# Patient Record
Sex: FEMALE | Race: WHITE | NOT HISPANIC OR LATINO | Employment: FULL TIME | URBAN - METROPOLITAN AREA
[De-identification: names, ages, dates, MRNs, and addresses within clinical notes are randomized per-mention and may not be internally consistent; named-entity substitution may affect disease eponyms.]

---

## 2017-01-03 ENCOUNTER — HOSPITAL ENCOUNTER (OUTPATIENT)
Dept: CARDIOLOGY | Facility: HOSPITAL | Age: 52
Discharge: HOME | End: 2017-01-03
Attending: INTERNAL MEDICINE | Admitting: INTERNAL MEDICINE

## 2017-01-10 ENCOUNTER — HOSPITAL ENCOUNTER (OUTPATIENT)
Dept: PREOP | Facility: HOSPITAL | Age: 52
Setting detail: HOSPITAL OUTPATIENT SURGERY
Discharge: HOME | End: 2017-01-10
Attending: SURGERY | Admitting: SURGERY

## 2017-01-10 LAB
BACTERIA UREASE ISLT QL: NORMAL
GLUCOSE BLD-MCNC: 209 MG/DL (ref 70–105)
Lab: NORMAL
MICRO REPORT STATUS: NORMAL
SPECIMEN SOURCE: NORMAL

## 2017-03-07 ENCOUNTER — HOSPITAL ENCOUNTER (OUTPATIENT)
Dept: LAB | Facility: HOSPITAL | Age: 52
Discharge: HOME | End: 2017-03-07
Attending: SURGERY | Admitting: SURGERY

## 2017-03-20 ENCOUNTER — HOSPITAL ENCOUNTER (OUTPATIENT)
Dept: OTHER | Facility: HOSPITAL | Age: 52
Discharge: HOME | End: 2017-03-20
Attending: SURGERY | Admitting: SURGERY

## 2017-03-20 LAB
ALBUMIN SERPL-MCNC: 4.2 G/DL (ref 3.5–4.8)
ALBUMIN/GLOB SERPL: 1.1 {RATIO} (ref 1–1.7)
ALP SERPL-CCNC: 58 IU/L (ref 32–91)
ALT SERPL-CCNC: 27 IU/L (ref 14–54)
ANION GAP SERPL CALC-SCNC: 15.7 MMOL/L (ref 10–20)
AST SERPL-CCNC: 28 IU/L (ref 15–41)
BASOPHILS # BLD AUTO: 0.1 10*3/UL (ref 0–0.2)
BASOPHILS NFR BLD AUTO: 1 % (ref 0–2)
BILIRUB SERPL-MCNC: 0.8 MG/DL (ref 0.3–1.2)
BUN SERPL-MCNC: 14 MG/DL (ref 8–20)
BUN/CREAT SERPL: 12.7 (ref 5.4–26.2)
CALCIUM SERPL-MCNC: 9.8 MG/DL (ref 8.9–10.3)
CHLORIDE SERPL-SCNC: 103 MMOL/L (ref 101–111)
CONV CO2: 26 MMOL/L (ref 22–32)
CONV TOTAL PROTEIN: 8.1 G/DL (ref 6.1–7.9)
CREAT UR-MCNC: 1.1 MG/DL (ref 0.4–1)
DIFFERENTIAL METHOD BLD: (no result)
EOSINOPHIL # BLD AUTO: 0.2 10*3/UL (ref 0–0.3)
EOSINOPHIL # BLD AUTO: 3 % (ref 0–3)
ERYTHROCYTE [DISTWIDTH] IN BLOOD BY AUTOMATED COUNT: 14.5 % (ref 11.5–14.5)
GLOBULIN UR ELPH-MCNC: 3.9 G/DL (ref 2.5–3.8)
GLUCOSE SERPL-MCNC: 122 MG/DL (ref 65–99)
HCT VFR BLD AUTO: 42.8 % (ref 35–49)
HGB BLD-MCNC: 14.4 G/DL (ref 12–15)
IRON SERPL-MCNC: 52 UG/DL (ref 28–170)
LYMPHOCYTES # BLD AUTO: 2 10*3/UL (ref 0.8–4.8)
LYMPHOCYTES NFR BLD AUTO: 28 % (ref 18–42)
MAGNESIUM SERPL-MCNC: 2.2 MG/DL (ref 1.8–2.5)
MCH RBC QN AUTO: 27.9 PG (ref 26–32)
MCHC RBC AUTO-ENTMCNC: 33.5 G/DL (ref 32–36)
MCV RBC AUTO: 83.2 FL (ref 80–94)
MONOCYTES # BLD AUTO: 0.4 10*3/UL (ref 0.1–1.3)
MONOCYTES NFR BLD AUTO: 5 % (ref 2–11)
NEUTROPHILS # BLD AUTO: 4.6 10*3/UL (ref 2.3–8.6)
NEUTROPHILS NFR BLD AUTO: 63 % (ref 50–75)
NRBC BLD AUTO-RTO: 0 /100{WBCS}
NRBC/RBC NFR BLD MANUAL: 0 10*3/UL
PLATELET # BLD AUTO: 289 10*3/UL (ref 150–450)
PMV BLD AUTO: 8.4 FL (ref 7.4–10.4)
POTASSIUM SERPL-SCNC: 4.7 MMOL/L (ref 3.6–5.1)
PREALB SERPL-MCNC: NORMAL MG/DL (ref 16–38)
RBC # BLD AUTO: 5.14 10*6/UL (ref 4–5.4)
SODIUM SERPL-SCNC: 140 MMOL/L (ref 136–144)
WBC # BLD AUTO: 7.3 10*3/UL (ref 4.5–11.5)

## 2017-05-15 ENCOUNTER — HOSPITAL ENCOUNTER (OUTPATIENT)
Dept: OTHER | Facility: HOSPITAL | Age: 52
Discharge: HOME | End: 2017-05-15
Attending: PHYSICIAN ASSISTANT | Admitting: PHYSICIAN ASSISTANT

## 2017-05-15 LAB
ALBUMIN SERPL-MCNC: 4.3 G/DL (ref 3.5–4.8)
ALBUMIN/GLOB SERPL: 1.5 {RATIO} (ref 1–1.7)
ALP SERPL-CCNC: 63 IU/L (ref 32–91)
ALT SERPL-CCNC: 18 IU/L (ref 14–54)
ANION GAP SERPL CALC-SCNC: 16.1 MMOL/L (ref 10–20)
AST SERPL-CCNC: 21 IU/L (ref 15–41)
BASOPHILS # BLD AUTO: 0.1 10*3/UL (ref 0–0.2)
BASOPHILS NFR BLD AUTO: 1 % (ref 0–2)
BILIRUB SERPL-MCNC: 0.9 MG/DL (ref 0.3–1.2)
BUN SERPL-MCNC: 15 MG/DL (ref 8–20)
BUN/CREAT SERPL: 15 (ref 5.4–26.2)
CALCIUM SERPL-MCNC: 9.5 MG/DL (ref 8.9–10.3)
CHLORIDE SERPL-SCNC: 103 MMOL/L (ref 101–111)
CONV CO2: 25 MMOL/L (ref 22–32)
CONV TOTAL PROTEIN: 7.1 G/DL (ref 6.1–7.9)
CREAT UR-MCNC: 1 MG/DL (ref 0.4–1)
DIFFERENTIAL METHOD BLD: (no result)
EOSINOPHIL # BLD AUTO: 0.2 10*3/UL (ref 0–0.3)
EOSINOPHIL # BLD AUTO: 2 % (ref 0–3)
ERYTHROCYTE [DISTWIDTH] IN BLOOD BY AUTOMATED COUNT: 14.7 % (ref 11.5–14.5)
GLOBULIN UR ELPH-MCNC: 2.8 G/DL (ref 2.5–3.8)
GLUCOSE SERPL-MCNC: 118 MG/DL (ref 65–99)
HCT VFR BLD AUTO: 43.2 % (ref 35–49)
HGB BLD-MCNC: 14.4 G/DL (ref 12–15)
LYMPHOCYTES # BLD AUTO: 2.4 10*3/UL (ref 0.8–4.8)
LYMPHOCYTES NFR BLD AUTO: 30 % (ref 18–42)
MCH RBC QN AUTO: 28.1 PG (ref 26–32)
MCHC RBC AUTO-ENTMCNC: 33.3 G/DL (ref 32–36)
MCV RBC AUTO: 84.2 FL (ref 80–94)
MONOCYTES # BLD AUTO: 0.4 10*3/UL (ref 0.1–1.3)
MONOCYTES NFR BLD AUTO: 5 % (ref 2–11)
NEUTROPHILS # BLD AUTO: 4.9 10*3/UL (ref 2.3–8.6)
NEUTROPHILS NFR BLD AUTO: 62 % (ref 50–75)
NRBC BLD AUTO-RTO: 0 /100{WBCS}
NRBC/RBC NFR BLD MANUAL: 0 10*3/UL
PLATELET # BLD AUTO: 249 10*3/UL (ref 150–450)
PMV BLD AUTO: 8.1 FL (ref 7.4–10.4)
POTASSIUM SERPL-SCNC: 4.1 MMOL/L (ref 3.6–5.1)
RBC # BLD AUTO: 5.13 10*6/UL (ref 4–5.4)
SODIUM SERPL-SCNC: 140 MMOL/L (ref 136–144)
WBC # BLD AUTO: 7.9 10*3/UL (ref 4.5–11.5)

## 2017-06-14 ENCOUNTER — HOSPITAL ENCOUNTER (OUTPATIENT)
Dept: GENERAL RADIOLOGY | Facility: HOSPITAL | Age: 52
Discharge: HOME | End: 2017-06-14
Attending: INTERNAL MEDICINE | Admitting: INTERNAL MEDICINE

## 2017-08-14 ENCOUNTER — HOSPITAL ENCOUNTER (OUTPATIENT)
Dept: OTHER | Facility: HOSPITAL | Age: 52
Discharge: HOME | End: 2017-08-14
Attending: SURGERY | Admitting: SURGERY

## 2017-08-14 LAB
ALBUMIN SERPL-MCNC: 4.1 G/DL (ref 3.5–4.8)
ALBUMIN/GLOB SERPL: 1.2 {RATIO} (ref 1–1.7)
ALP SERPL-CCNC: 60 IU/L (ref 32–91)
ALT SERPL-CCNC: 13 IU/L (ref 14–54)
ANION GAP SERPL CALC-SCNC: 13.2 MMOL/L (ref 10–20)
AST SERPL-CCNC: 19 IU/L (ref 15–41)
BASOPHILS # BLD AUTO: 0.1 10*3/UL (ref 0–0.2)
BASOPHILS NFR BLD AUTO: 1 % (ref 0–2)
BILIRUB SERPL-MCNC: 1 MG/DL (ref 0.3–1.2)
BUN SERPL-MCNC: 17 MG/DL (ref 8–20)
BUN/CREAT SERPL: 17 (ref 5.4–26.2)
CALCIUM SERPL-MCNC: 9.7 MG/DL (ref 8.9–10.3)
CHLORIDE SERPL-SCNC: 103 MMOL/L (ref 101–111)
CONV CO2: 27 MMOL/L (ref 22–32)
CONV TOTAL PROTEIN: 7.6 G/DL (ref 6.1–7.9)
CREAT UR-MCNC: 1 MG/DL (ref 0.4–1)
DIFFERENTIAL METHOD BLD: (no result)
EOSINOPHIL # BLD AUTO: 0.1 10*3/UL (ref 0–0.3)
EOSINOPHIL # BLD AUTO: 1 % (ref 0–3)
ERYTHROCYTE [DISTWIDTH] IN BLOOD BY AUTOMATED COUNT: 15.1 % (ref 11.5–14.5)
FERRITIN SERPL-MCNC: 36 NG/ML (ref 11–307)
FOLATE SERPL-MCNC: >24.8 NG/ML (ref 5.9–24.8)
GLOBULIN UR ELPH-MCNC: 3.5 G/DL (ref 2.5–3.8)
GLUCOSE SERPL-MCNC: 107 MG/DL (ref 65–99)
HCT VFR BLD AUTO: 40.8 % (ref 35–49)
HGB BLD-MCNC: 13.7 G/DL (ref 12–15)
LYMPHOCYTES # BLD AUTO: 2.2 10*3/UL (ref 0.8–4.8)
LYMPHOCYTES NFR BLD AUTO: 25 % (ref 18–42)
MCH RBC QN AUTO: 28.7 PG (ref 26–32)
MCHC RBC AUTO-ENTMCNC: 33.6 G/DL (ref 32–36)
MCV RBC AUTO: 85.3 FL (ref 80–94)
MONOCYTES # BLD AUTO: 0.5 10*3/UL (ref 0.1–1.3)
MONOCYTES NFR BLD AUTO: 5 % (ref 2–11)
NEUTROPHILS # BLD AUTO: 5.9 10*3/UL (ref 2.3–8.6)
NEUTROPHILS NFR BLD AUTO: 68 % (ref 50–75)
NRBC BLD AUTO-RTO: 0 /100{WBCS}
NRBC/RBC NFR BLD MANUAL: 0 10*3/UL
PLATELET # BLD AUTO: 251 10*3/UL (ref 150–450)
PMV BLD AUTO: 7.5 FL (ref 7.4–10.4)
POTASSIUM SERPL-SCNC: 4.2 MMOL/L (ref 3.6–5.1)
RBC # BLD AUTO: 4.78 10*6/UL (ref 4–5.4)
SODIUM SERPL-SCNC: 139 MMOL/L (ref 136–144)
WBC # BLD AUTO: 8.9 10*3/UL (ref 4.5–11.5)

## 2018-02-02 ENCOUNTER — HOSPITAL ENCOUNTER (OUTPATIENT)
Dept: MAMMOGRAPHY | Facility: HOSPITAL | Age: 53
Discharge: HOME | End: 2018-02-02
Attending: NURSE PRACTITIONER | Admitting: NURSE PRACTITIONER

## 2018-03-01 ENCOUNTER — HOSPITAL ENCOUNTER (OUTPATIENT)
Dept: MAMMOGRAPHY | Facility: HOSPITAL | Age: 53
Discharge: HOME | End: 2018-03-01
Attending: NURSE PRACTITIONER | Admitting: NURSE PRACTITIONER

## 2018-07-30 ENCOUNTER — HOSPITAL ENCOUNTER (OUTPATIENT)
Dept: OTHER | Facility: HOSPITAL | Age: 53
Discharge: HOME | End: 2018-07-30
Attending: SURGERY | Admitting: SURGERY

## 2018-07-30 LAB
ALBUMIN SERPL-MCNC: 4.2 G/DL (ref 3.5–4.8)
ALBUMIN/GLOB SERPL: 1.2 {RATIO} (ref 1–1.7)
ALP SERPL-CCNC: 88 IU/L (ref 32–91)
ALT SERPL-CCNC: 16 IU/L (ref 14–54)
ANION GAP SERPL CALC-SCNC: 12.1 MMOL/L (ref 10–20)
AST SERPL-CCNC: 23 IU/L (ref 15–41)
BASOPHILS # BLD AUTO: 0.1 10*3/UL (ref 0–0.2)
BASOPHILS NFR BLD AUTO: 1 % (ref 0–2)
BILIRUB SERPL-MCNC: 0.7 MG/DL (ref 0.3–1.2)
BUN SERPL-MCNC: 19 MG/DL (ref 8–20)
BUN/CREAT SERPL: 19 (ref 5.4–26.2)
CALCIUM SERPL-MCNC: 9.9 MG/DL (ref 8.9–10.3)
CHLORIDE SERPL-SCNC: 101 MMOL/L (ref 101–111)
CONV CO2: 27 MMOL/L (ref 22–32)
CONV TOTAL PROTEIN: 7.7 G/DL (ref 6.1–7.9)
CREAT UR-MCNC: 1 MG/DL (ref 0.4–1)
DIFFERENTIAL METHOD BLD: (no result)
EOSINOPHIL # BLD AUTO: 0.2 10*3/UL (ref 0–0.3)
EOSINOPHIL # BLD AUTO: 2 % (ref 0–3)
ERYTHROCYTE [DISTWIDTH] IN BLOOD BY AUTOMATED COUNT: 13.7 % (ref 11.5–14.5)
GLOBULIN UR ELPH-MCNC: 3.5 G/DL (ref 2.5–3.8)
GLUCOSE SERPL-MCNC: 168 MG/DL (ref 65–99)
HCT VFR BLD AUTO: 43.1 % (ref 35–49)
HGB BLD-MCNC: 14.6 G/DL (ref 12–15)
IRON SERPL-MCNC: 64 UG/DL (ref 28–170)
LYMPHOCYTES # BLD AUTO: 2.6 10*3/UL (ref 0.8–4.8)
LYMPHOCYTES NFR BLD AUTO: 31 % (ref 18–42)
MAGNESIUM SERPL-MCNC: 2 MG/DL (ref 1.8–2.5)
MCH RBC QN AUTO: 29 PG (ref 26–32)
MCHC RBC AUTO-ENTMCNC: 33.8 G/DL (ref 32–36)
MCV RBC AUTO: 85.9 FL (ref 80–94)
MONOCYTES # BLD AUTO: 0.5 10*3/UL (ref 0.1–1.3)
MONOCYTES NFR BLD AUTO: 6 % (ref 2–11)
NEUTROPHILS # BLD AUTO: 5 10*3/UL (ref 2.3–8.6)
NEUTROPHILS NFR BLD AUTO: 60 % (ref 50–75)
NRBC BLD AUTO-RTO: 0 /100{WBCS}
NRBC/RBC NFR BLD MANUAL: 0 10*3/UL
PHOSPHATE SERPL-MCNC: 3.5 MG/DL (ref 2.4–4.7)
PLATELET # BLD AUTO: 279 10*3/UL (ref 150–450)
PMV BLD AUTO: 7.5 FL (ref 7.4–10.4)
POTASSIUM SERPL-SCNC: 4.1 MMOL/L (ref 3.6–5.1)
PREALB SERPL-MCNC: NORMAL MG/DL (ref 16–38)
RBC # BLD AUTO: 5.02 10*6/UL (ref 4–5.4)
SODIUM SERPL-SCNC: 136 MMOL/L (ref 136–144)
WBC # BLD AUTO: 8.4 10*3/UL (ref 4.5–11.5)

## 2019-05-20 ENCOUNTER — HOSPITAL ENCOUNTER (OUTPATIENT)
Dept: MAMMOGRAPHY | Facility: HOSPITAL | Age: 54
Discharge: HOME | End: 2019-05-20
Attending: NURSE PRACTITIONER | Admitting: NURSE PRACTITIONER

## 2019-06-11 ENCOUNTER — CONVERSION ENCOUNTER (OUTPATIENT)
Dept: OTHER | Facility: HOSPITAL | Age: 54
End: 2019-06-11

## 2019-06-12 VITALS
HEART RATE: 67 BPM | HEIGHT: 65 IN | SYSTOLIC BLOOD PRESSURE: 121 MMHG | BODY MASS INDEX: 49.75 KG/M2 | DIASTOLIC BLOOD PRESSURE: 86 MMHG

## 2019-10-14 ENCOUNTER — OFFICE VISIT (OUTPATIENT)
Dept: ORTHOPEDIC SURGERY | Facility: CLINIC | Age: 54
End: 2019-10-14

## 2019-10-14 VITALS
BODY MASS INDEX: 50.02 KG/M2 | DIASTOLIC BLOOD PRESSURE: 87 MMHG | HEIGHT: 64 IN | SYSTOLIC BLOOD PRESSURE: 139 MMHG | WEIGHT: 293 LBS | HEART RATE: 62 BPM

## 2019-10-14 DIAGNOSIS — M25.561 ACUTE PAIN OF BOTH KNEES: Primary | ICD-10-CM

## 2019-10-14 DIAGNOSIS — M17.0 BILATERAL PRIMARY OSTEOARTHRITIS OF KNEE: ICD-10-CM

## 2019-10-14 DIAGNOSIS — M25.562 ACUTE PAIN OF BOTH KNEES: Primary | ICD-10-CM

## 2019-10-14 PROBLEM — E78.5 HYPERLIPIDEMIA: Status: ACTIVE | Noted: 2019-10-14

## 2019-10-14 PROBLEM — Z00.8 OTHER SPECIFIED GENERAL MEDICAL EXAMINATIONS: Status: ACTIVE | Noted: 2017-07-03

## 2019-10-14 PROBLEM — IMO0002 DDD (DEGENERATIVE DISC DISEASE): Status: ACTIVE | Noted: 2019-10-14

## 2019-10-14 PROBLEM — N39.0 URINARY TRACT INFECTION: Status: ACTIVE | Noted: 2017-12-19

## 2019-10-14 PROBLEM — F41.9 ANXIETY: Status: ACTIVE | Noted: 2019-10-14

## 2019-10-14 PROBLEM — M17.9 OSTEOARTHRITIS OF KNEE: Status: ACTIVE | Noted: 2017-06-13

## 2019-10-14 PROBLEM — Z98.84 STATUS POST BARIATRIC SURGERY: Status: ACTIVE | Noted: 2017-03-08

## 2019-10-14 PROCEDURE — 99203 OFFICE O/P NEW LOW 30 MIN: CPT | Performed by: FAMILY MEDICINE

## 2019-10-14 RX ORDER — CHLORAL HYDRATE 500 MG
CAPSULE ORAL
COMMUNITY

## 2019-10-14 RX ORDER — NAPROXEN SODIUM 220 MG
220 TABLET ORAL 2 TIMES DAILY PRN
COMMUNITY
End: 2019-10-16

## 2019-10-14 RX ORDER — SENNOSIDES 8.6 MG
650 CAPSULE ORAL
COMMUNITY

## 2019-10-14 NOTE — PROGRESS NOTES
Primary Care Sports Medicine Office Visit Note     Patient ID: Marco Heath is a 54 y.o. female.    Chief Complaint:  Chief Complaint   Patient presents with   • Left Knee - Initial Evaluation   • Right Knee - Initial Evaluation     HPI:    Ms. Marco Heath is a 54 y.o. female who presents to the clinic today for bilateral knee pain.  She is morbidly obese, and has had a considerable amount of knee pain for the past 5 to 10 years, worsening.  She states she has not had any trauma, injury, or fall.  Insidious onset knee pain.  Worse with activity, better with rest.  Worse with stairs or incline activity.    Past Medical History:   Diagnosis Date   • Anxiety 10/14/2019   • Arthritis 2015   • Body mass index 60.0-69.9, adult (CMS/[facility]) 12/6/2016   • Cancer (CMS/[facility]) Uterine 2010   • CTS (carpal tunnel syndrome) 1992   • Depression 11/28/2016   • Diabetes mellitus (CMS/[facility]) 2012   • Essential hypertension 12/17/2013    Overview:  2015 IMO UPDATE   • Hyperlipidemia 10/14/2019   • Hypertension 2010   • Knee pain, right 11/5/2018   • Osteoarthritis Since 2015   • Osteoarthritis of knee 6/13/2017   • Type 2 diabetes mellitus without complications (CMS/[facility]) 11/28/2016       Past Surgical History:   Procedure Laterality Date   • CARPAL TUNNEL RELEASE  1992       Family History   Problem Relation Age of Onset   • Osteoporosis Mother    • Osteoporosis Maternal Grandmother      Social History     Occupational History   • Not on file   Tobacco Use   • Smoking status: Never Smoker   Substance and Sexual Activity   • Alcohol use: No   • Drug use: No   • Sexual activity: Not Currently     Partners: Male      Review of Systems   Constitutional: Negative for activity change and fever.   Respiratory: Negative for cough and shortness of breath.    Cardiovascular: Negative for chest pain.   Gastrointestinal: Negative for constipation, diarrhea, nausea and vomiting.   Musculoskeletal: Positive for arthralgias.   Skin: Negative for color  "change and rash.   Neurological: Negative for weakness.   Hematological: Does not bruise/bleed easily.       Objective:    /87 (BP Location: Left arm, Patient Position: Sitting, Cuff Size: Adult)   Pulse 62   Ht 162.6 cm (64\")   Wt (!) 147 kg (323 lb 12.8 oz)   BMI 55.58 kg/m²     Physical Examination:  Physical Exam   Constitutional: She appears well-developed and well-nourished. No distress.   HENT:   Head: Normocephalic and atraumatic.   Eyes: Conjunctivae are normal.   Cardiovascular: Intact distal pulses.   Pulmonary/Chest: Effort normal. No respiratory distress.   Musculoskeletal:        Right knee: She exhibits no effusion.        Left knee: She exhibits no effusion.   Neurological: She is alert.   Skin: Skin is warm. Capillary refill takes less than 2 seconds. She is not diaphoretic.   Nursing note and vitals reviewed.    Left Ankle Exam     Range of Motion   The patient has normal left ankle ROM.       Right Knee Exam     Muscle Strength   The patient has normal right knee strength.    Tenderness   The patient is experiencing tenderness in the lateral joint line, medial joint line and patella.    Range of Motion   Right knee extension: mildly decreased end ROM.   Right knee flexion: mildly decreased end ROM.     Tests   Rosalino:  Medial - negative Lateral - negative  Varus: negative Valgus: negative  Right knee patellar apprehension test: +patellar grind, +farzana il.    Other   Erythema: absent  Sensation: normal  Pulse: present (distal to the knee, DP and PT palpable)  Swelling: none  Effusion: no effusion present      Left Knee Exam     Muscle Strength   The patient has normal left knee strength.    Tenderness   The patient is experiencing tenderness in the lateral joint line, medial joint line and patella.    Range of Motion   Left knee extension: mildly decreased end ROM.   Left knee flexion: mildly decreased end ROM.     Tests   Rosalino:  Medial - negative Lateral - negative  Varus: " "negative Valgus: negative  Left knee patellar apprehension test: +patellar grind testing, +farzana li testing.    Other   Erythema: absent  Sensation: normal  Pulse: present (distal to the knee, DP and PT palpable)  Swelling: none  Effusion: no effusion present          Imaging and other tests:  Three-view XR of bilateral knees today yields obvious and gross joint hypertrophy with osteophytic change tricompartmentally.  There is also some sclerosis and joint space narrowing.  Worse in the medial compartment.    Assessment and Plan:    1. Acute pain of both knees  - XR Knee 3 View Bilateral    2. Bilateral primary osteoarthritis of knee    Status discussed conservative measures for osteoarthritis of bilateral knees.  Though she is relatively severe she has not attempted any treatment modalities for this.  I recommended she continue to attempt to lose weight as this would help significantly.  I would like to see her exercising 20 minutes a day 3 days a week on fluid motion knee exercises, i.e. biking, swimming, elliptical machine.  Also like for her to attempt glucosamine/chondroitin supplementation.  She was given a prescription for meloxicam today as well for anti-inflammatory benefit.  I would like to see her back in 2 to 3 months to evaluate pain at that time.  Otherwise, could consider intra-articular injection and/or hyaluronic acid at that time if needed.      Armond BENJAMIN \"Chance\" Miguel DURANT DO, CAQSM  10/16/19  12:12 PM    Disclaimer: Please note that areas of this note were completed with computer voice recognition software.  Quite often unanticipated grammatical, syntax, homophones, and other interpretive errors are inadvertently transcribed by the computer software. Please excuse any errors that have escaped final proofreading.  "

## 2019-10-16 RX ORDER — MELOXICAM 15 MG/1
15 TABLET ORAL DAILY PRN
Qty: 30 TABLET | Refills: 4 | Status: SHIPPED | OUTPATIENT
Start: 2019-10-16 | End: 2020-01-27

## 2019-10-21 ENCOUNTER — OFFICE VISIT (OUTPATIENT)
Dept: ORTHOPEDIC SURGERY | Facility: CLINIC | Age: 54
End: 2019-10-21

## 2019-10-21 VITALS
HEART RATE: 58 BPM | SYSTOLIC BLOOD PRESSURE: 144 MMHG | BODY MASS INDEX: 50.02 KG/M2 | WEIGHT: 293 LBS | DIASTOLIC BLOOD PRESSURE: 87 MMHG | HEIGHT: 64 IN

## 2019-10-21 DIAGNOSIS — M17.0 BILATERAL PRIMARY OSTEOARTHRITIS OF KNEE: Primary | ICD-10-CM

## 2019-10-21 PROCEDURE — 20611 DRAIN/INJ JOINT/BURSA W/US: CPT | Performed by: FAMILY MEDICINE

## 2019-10-21 NOTE — PROGRESS NOTES
"Primary Care Sports Medicine Office Visit Note     Patient ID: Marco Heath is a 54 y.o. female.    Chief Complaint:  Chief Complaint   Patient presents with   • Right Knee - Follow-up, Pain   • Left Knee - Follow-up, Pain     HPI:    Ms. Marco Heath is a 54 y.o. female who presents to the clinic today for hyaluronic acid injection.  Please see note dated 10/14/2019 for complete history.  She was going to attempt conservative measures, but changed her mind and return here today for hyaluronic acid.  No new trauma, injury, or fall.  Insidious onset knee pain continues, deep and achy, as it was previous to last visit.  No new complaint.  Here for injection only today.    Past Medical History:   Diagnosis Date   • Anxiety 10/14/2019   • Arthritis 2015   • Body mass index 60.0-69.9, adult (CMS/[facility]) 12/6/2016   • Cancer (CMS/[facility]) Uterine 2010   • CTS (carpal tunnel syndrome) 1992   • Depression 11/28/2016   • Diabetes mellitus (CMS/[facility]) 2012   • Essential hypertension 12/17/2013    Overview:  2015 IMO UPDATE   • Hyperlipidemia 10/14/2019   • Hypertension 2010   • Knee pain, right 11/5/2018   • Osteoarthritis Since 2015   • Osteoarthritis of knee 6/13/2017   • Type 2 diabetes mellitus without complications (CMS/[facility]) 11/28/2016       Past Surgical History:   Procedure Laterality Date   • CARPAL TUNNEL RELEASE  1992       Family History   Problem Relation Age of Onset   • Osteoporosis Mother    • Osteoporosis Maternal Grandmother      Social History     Occupational History   • Not on file   Tobacco Use   • Smoking status: Never Smoker   Substance and Sexual Activity   • Alcohol use: No   • Drug use: No   • Sexual activity: Not Currently     Partners: Male      Review of Systems   Constitutional: Negative for activity change, fatigue and fever.   Musculoskeletal: Positive for arthralgias.   Skin: Negative for color change and rash.     Objective:    /87   Pulse 58   Ht 162.6 cm (64\")   Wt (!) 147 kg (323 lb)   " BMI 55.44 kg/m²     Physical Examination:  Physical Exam   Constitutional: She appears well-developed and well-nourished. No distress.   obese   HENT:   Head: Normocephalic and atraumatic.   Eyes: Conjunctivae are normal.   Cardiovascular: Intact distal pulses.   Pulmonary/Chest: Effort normal. No respiratory distress.   Musculoskeletal:        Right knee: She exhibits no effusion.        Left knee: She exhibits no effusion.   Neurological: She is alert.   Skin: Skin is warm. Capillary refill takes less than 2 seconds. She is not diaphoretic.   Nursing note and vitals reviewed.    Left Ankle Exam     Range of Motion   The patient has normal left ankle ROM.       Right Knee Exam     Muscle Strength   The patient has normal right knee strength.    Tenderness   The patient is experiencing tenderness in the lateral joint line, medial joint line and patella.    Range of Motion   Right knee flexion: Significantly decreased end ROM due to body habitus.     Tests   Rosalino:  Medial - negative Lateral - negative  Varus: negative Valgus: negative  Right knee patellar apprehension test: +patellar grind, +farzana li.    Other   Erythema: absent  Sensation: normal  Pulse: present (distal to the knee, DP and PT palpable)  Swelling: none  Effusion: no effusion present    Comments:  Positive patellar grind testing, positive Khang Don.      Left Knee Exam     Muscle Strength   The patient has normal left knee strength.    Tenderness   The patient is experiencing tenderness in the lateral joint line, medial joint line and patella.    Range of Motion   Left knee flexion: Significantly decreased end ROM due to body habitus.     Tests   Rosalino:  Medial - negative Lateral - negative  Varus: negative Valgus: negative  Left knee patellar apprehension test: +patellar grind testing, +farzana li testing.    Other   Erythema: absent  Sensation: normal  Pulse: present (distal to the knee, DP and PT palpable)  Swelling:  "none  Effusion: no effusion present    Comments:  Positive patellar grind testing, positive Khang Don.          Imaging and other tests:  No new imaging today    Assessment and Plan:    1. Bilateral primary osteoarthritis of knee    After discussing risk and benefits, the patient elects to proceed with hyaluronic acid injection today.  I recommended she ice the knee after injection 2-3 times daily.  She can continue anti-inflammatory medications as necessary.  Otherwise decrease activity for the next 3 to 5 days, then okay to return to normal daily activity.  Injection well today without complication under ultrasound guidance (due to body habitus).  RTC in 3 months.      Armond BENJAMIN \"Chance\" Miguel DURANT DO, CAQSM  10/21/19  5:02 PM    Disclaimer: Please note that areas of this note were completed with computer voice recognition software.  Quite often unanticipated grammatical, syntax, homophones, and other interpretive errors are inadvertently transcribed by the computer software. Please excuse any errors that have escaped final proofreading.  "

## 2019-10-21 NOTE — PROGRESS NOTES
Procedure   Large Joint Arthrocentesis  Date/Time: 10/21/2019 5:03 PM  Consent given by: patient  Site marked: site marked  Timeout: Immediately prior to procedure a time out was called to verify the correct patient, procedure, equipment, support staff and site/side marked as required   Supporting Documentation  Indications: pain   Procedure Details  Location: knee - Knee joint: Bilateral.  Preparation: Patient was prepped and draped in the usual sterile fashion  Needle size: 22 G  Approach: anteromedial  Medications administered: 88 mg Hyaluronan 88 MG/4ML  Patient tolerance: patient tolerated the procedure well with no immediate complications (Blood loss negligable, pt admits to immediate decrease in pain and improved ROM with gentle ambulation post injection.)      Due to body habitus, the above procedure was performed under the guidance of ultrasound.  This was necessary with the cost of this medication to ensure intra-articular placement as anatomic guidance is very difficult in this patient due to the size and anatomy of bilateral knees.  Please see saved images in the ultrasound machine.

## 2019-10-22 ENCOUNTER — TELEPHONE (OUTPATIENT)
Dept: ORTHOPEDIC SURGERY | Facility: CLINIC | Age: 54
End: 2019-10-22

## 2019-10-22 NOTE — TELEPHONE ENCOUNTER
A manual benefit verification request has been submitted and is currently in progress. We will notify you when your response is ready for review - usually within one to two business days.<

## 2020-01-27 ENCOUNTER — OFFICE VISIT (OUTPATIENT)
Dept: ORTHOPEDIC SURGERY | Facility: CLINIC | Age: 55
End: 2020-01-27

## 2020-01-27 VITALS
HEIGHT: 64 IN | SYSTOLIC BLOOD PRESSURE: 162 MMHG | DIASTOLIC BLOOD PRESSURE: 91 MMHG | WEIGHT: 293 LBS | BODY MASS INDEX: 50.02 KG/M2 | HEART RATE: 67 BPM

## 2020-01-27 DIAGNOSIS — M17.0 BILATERAL PRIMARY OSTEOARTHRITIS OF KNEE: ICD-10-CM

## 2020-01-27 PROCEDURE — 99213 OFFICE O/P EST LOW 20 MIN: CPT | Performed by: FAMILY MEDICINE

## 2020-02-03 PROCEDURE — 20610 DRAIN/INJ JOINT/BURSA W/O US: CPT | Performed by: FAMILY MEDICINE

## 2020-02-03 RX ORDER — MELOXICAM 15 MG/1
15 TABLET ORAL DAILY PRN
Qty: 30 TABLET | Refills: 4 | Status: SHIPPED | OUTPATIENT
Start: 2020-02-03 | End: 2020-11-02 | Stop reason: SDUPTHER

## 2020-02-03 RX ORDER — TRIAMCINOLONE ACETONIDE 40 MG/ML
80 INJECTION, SUSPENSION INTRA-ARTICULAR; INTRAMUSCULAR
Status: COMPLETED | OUTPATIENT
Start: 2020-02-03 | End: 2020-02-03

## 2020-02-03 RX ADMIN — TRIAMCINOLONE ACETONIDE 80 MG: 40 INJECTION, SUSPENSION INTRA-ARTICULAR; INTRAMUSCULAR at 12:43

## 2020-02-03 NOTE — PROGRESS NOTES
Procedure   Large Joint Arthrocentesis  Date/Time: 2/3/2020 12:43 PM  Consent given by: patient  Site marked: site marked  Timeout: Immediately prior to procedure a time out was called to verify the correct patient, procedure, equipment, support staff and site/side marked as required   Supporting Documentation  Indications: pain   Procedure Details  Location: knee - Knee joint: bilateral knees.  Preparation: Patient was prepped and draped in the usual sterile fashion  Needle size: 25 G  Approach: anteromedial  Medications administered: 80 mg triamcinolone acetonide 40 MG/ML (2cc of 1% lidocaine without epinepherine, and 2cc of 40mg Kenalog)  Patient tolerance: patient tolerated the procedure well with no immediate complications (Blood loss negligable, pt admits to immediate decrease in pain and improved ROM with gentle ambulation post injection.)

## 2020-04-27 ENCOUNTER — OFFICE VISIT (OUTPATIENT)
Dept: ORTHOPEDIC SURGERY | Facility: CLINIC | Age: 55
End: 2020-04-27

## 2020-04-27 VITALS
WEIGHT: 293 LBS | SYSTOLIC BLOOD PRESSURE: 153 MMHG | BODY MASS INDEX: 50.02 KG/M2 | DIASTOLIC BLOOD PRESSURE: 96 MMHG | HEART RATE: 76 BPM | HEIGHT: 64 IN

## 2020-04-27 DIAGNOSIS — M17.0 PRIMARY OSTEOARTHRITIS OF BOTH KNEES: Primary | ICD-10-CM

## 2020-04-27 DIAGNOSIS — M62.830 LUMBAR PARASPINAL MUSCLE SPASM: ICD-10-CM

## 2020-04-27 PROCEDURE — 99213 OFFICE O/P EST LOW 20 MIN: CPT | Performed by: FAMILY MEDICINE

## 2020-04-27 PROCEDURE — 20610 DRAIN/INJ JOINT/BURSA W/O US: CPT | Performed by: FAMILY MEDICINE

## 2020-04-27 RX ORDER — TRIAMCINOLONE ACETONIDE 40 MG/ML
80 INJECTION, SUSPENSION INTRA-ARTICULAR; INTRAMUSCULAR
Status: COMPLETED | OUTPATIENT
Start: 2020-04-27 | End: 2020-04-27

## 2020-04-27 RX ORDER — VITAMIN B COMPLEX
CAPSULE ORAL DAILY
COMMUNITY
End: 2020-05-18

## 2020-04-27 RX ADMIN — TRIAMCINOLONE ACETONIDE 80 MG: 40 INJECTION, SUSPENSION INTRA-ARTICULAR; INTRAMUSCULAR at 16:59

## 2020-04-27 NOTE — PROGRESS NOTES
Primary Care Sports Medicine Office Visit Note     Patient ID: Marco Heath is a 55 y.o. female.    Chief Complaint:  Chief Complaint   Patient presents with   • Right Knee - Follow-up, Pain   • Left Knee - Follow-up, Pain     HPI:    Ms. Marco Heath is a 55 y.o. female who returns to the clinic today for known osteoarthritis of bilateral knees.  Right knee is doing well today, unfortunately left knee pain has returned.  She requests repeat corticosteroid injection.  She has had pain relief of bilateral knees for greater than 2-1/2 weeks, and now at 3 months has not yet had return of pain to her right knee.  She has had a good result with corticosteroid.  The pain that has returned to her left knee is similar in type and location to previous known osteoarthritic pain.  Deep and achy.  No new falls or injuries.    Lastly, she also states she has considerable amount of pain in the left side of her low back.  She has known degenerative disc disease, but denies any lightening, tingling, numbness, or other neurologic symptom.  Tightness, spasm, and pain in the musculature on the left side of her low back only.    Past Medical History:   Diagnosis Date   • Anxiety 10/14/2019   • Arthritis 2015   • Body mass index 60.0-69.9, adult (CMS/[facility]) 12/6/2016   • Cancer (CMS/[facility]) Uterine 2010   • CTS (carpal tunnel syndrome) 1992   • Depression 11/28/2016   • Diabetes mellitus (CMS/[facility]) 2012   • Essential hypertension 12/17/2013    Overview:  2015 IMO UPDATE   • Hyperlipidemia 10/14/2019   • Hypertension 2010   • Knee pain, right 11/5/2018   • Osteoarthritis Since 2015   • Osteoarthritis of knee 6/13/2017   • Type 2 diabetes mellitus without complications (CMS/[facility]) 11/28/2016       Past Surgical History:   Procedure Laterality Date   • CARPAL TUNNEL RELEASE  1992       Family History   Problem Relation Age of Onset   • Osteoporosis Mother    • Osteoporosis Maternal Grandmother      Social History     Occupational History   • Not  "on file   Tobacco Use   • Smoking status: Never Smoker   Substance and Sexual Activity   • Alcohol use: No   • Drug use: No   • Sexual activity: Not Currently     Partners: Male      Review of Systems   Constitutional: Negative for activity change and fever.   Musculoskeletal: Positive for arthralgias and back pain.   Skin: Negative for color change and rash.   Neurological: Negative for weakness.       Objective:    /96 (BP Location: Left arm, Patient Position: Sitting, Cuff Size: Large Adult)   Pulse 76   Ht 162.6 cm (64\")   Wt (!) 149 kg (328 lb)   BMI 56.30 kg/m²     Physical Examination:  Physical Exam   Constitutional: She appears well-developed and well-nourished. No distress.   Obese   HENT:   Head: Normocephalic and atraumatic.   Eyes: Conjunctivae are normal.   Cardiovascular: Intact distal pulses.   Pulmonary/Chest: Effort normal. No respiratory distress.   Musculoskeletal:        Left knee: She exhibits no effusion.   Neurological: She is alert.   Skin: Skin is warm. Capillary refill takes less than 2 seconds. She is not diaphoretic.   Nursing note and vitals reviewed.    Left Ankle Exam     Range of Motion   The patient has normal left ankle ROM.       Left Knee Exam     Muscle Strength   The patient has normal left knee strength.    Tenderness   The patient is experiencing tenderness in the lateral joint line, medial joint line and patella.    Range of Motion   Left knee extension: mildly decreased end ROM.   Left knee flexion: mildly decreased end ROM.     Tests   Rosalino:  Medial - negative Lateral - negative  Varus: negative Valgus: negative  Left knee patellar apprehension test: +patellar grind testing, +farzana li testing.    Other   Erythema: absent  Sensation: normal  Pulse: present (distal to the knee, DP and PT palpable)  Swelling: none  Effusion: no effusion present    Comments:  Exam difficult due to large body habitus      Back Exam     Comments:  Mild decreased range of " "motion to left sided rotation, and left sidebending.  Straight leg raise negative.  There is tenderness palpation of the paraspinal lumbar musculature, but no tenderness to the bony midline.  Strength of left lower extremity is 5/5 to lower large muscle groups, and sensation is intact to the entirety of the left lower extremity.          Imaging and other tests:  No new imaging today.    Assessment and Plan:    1. Primary osteoarthritis of both knees    2.  Paraspinal lumbar muscle spasm    After discussion of risks and benefits, the patient elected to proceed with corticosteroid injection to the left knee.  The patient tolerated this procedure well without any complaints or problems.  I recommended continuation of conservative management as previous, RTC in 3-6 months or sooner if symptoms recur.    Otherwise, we discussed over-the-counter anti-inflammatories and Flexeril for lumbar paraspinal muscle spasm.  I would like for her to take 1 Flexeril nightly only so that she is not drowsy during the day for the next 7 days.  Call in 1 week if no improvement.    Armond BENJAMIN \"Chance\" Miguel DURANT DO, CAQSM  04/27/20  16:58    Disclaimer: Please note that areas of this note were completed with computer voice recognition software.  Quite often unanticipated grammatical, syntax, homophones, and other interpretive errors are inadvertently transcribed by the computer software. Please excuse any errors that have escaped final proofreading.  "

## 2020-04-27 NOTE — PROGRESS NOTES
Procedure   Large Joint Arthrocentesis: L knee  Date/Time: 4/27/2020 4:59 PM  Consent given by: patient  Timeout: Immediately prior to procedure a time out was called to verify the correct patient, procedure, equipment, support staff and site/side marked as required   Supporting Documentation  Indications: pain   Procedure Details  Location: knee - L knee  Preparation: Patient was prepped and draped in the usual sterile fashion  Needle size: 25 G  Approach: anteromedial  Medications administered: 80 mg triamcinolone acetonide 40 MG/ML (2cc of 1% lidocaine without epinepherine, and 2cc of 40mg Kenalog)  Patient tolerance: patient tolerated the procedure well with no immediate complications (Blood loss negligable, pt admits to immediate decrease in pain and improved ROM with gentle ambulation post injection.)

## 2020-04-28 DIAGNOSIS — M62.830 LUMBAR PARASPINAL MUSCLE SPASM: Primary | ICD-10-CM

## 2020-04-28 RX ORDER — CYCLOBENZAPRINE HCL 5 MG
5 TABLET ORAL
Qty: 7 TABLET | Refills: 0 | Status: SHIPPED | OUTPATIENT
Start: 2020-04-28 | End: 2020-05-18

## 2020-05-18 ENCOUNTER — OFFICE VISIT (OUTPATIENT)
Dept: ORTHOPEDIC SURGERY | Facility: CLINIC | Age: 55
End: 2020-05-18

## 2020-05-18 VITALS
BODY MASS INDEX: 50.02 KG/M2 | HEART RATE: 71 BPM | WEIGHT: 293 LBS | TEMPERATURE: 97.3 F | HEIGHT: 64 IN | DIASTOLIC BLOOD PRESSURE: 86 MMHG | SYSTOLIC BLOOD PRESSURE: 138 MMHG

## 2020-05-18 DIAGNOSIS — M62.830 LUMBAR PARASPINAL MUSCLE SPASM: Primary | ICD-10-CM

## 2020-05-18 PROCEDURE — 99214 OFFICE O/P EST MOD 30 MIN: CPT | Performed by: FAMILY MEDICINE

## 2020-05-18 RX ORDER — CYCLOBENZAPRINE HCL 5 MG
5 TABLET ORAL 3 TIMES DAILY PRN
Qty: 30 TABLET | Refills: 0 | Status: SHIPPED | OUTPATIENT
Start: 2020-05-18 | End: 2020-07-28

## 2020-05-18 NOTE — PROGRESS NOTES
Primary Care Sports Medicine Office Visit Note     Patient ID: Marco Heath is a 55 y.o. female.    Chief Complaint:  Chief Complaint   Patient presents with   • Middle Back - Pain, Consult     HPI:    Ms. Marco Heath is a 55 y.o. female who presents to the clinic today for back evaluation s/p fall. Pt states that she had a fall on 5/8/2020 (10 days ago) and was seen in the [facility] in [place]. She states at that time she was told that she did not have any obvious fractures. She had XR lumbar spine and was told it was essentially normal/nonacute (I do not have this study today). Today she states that she continues to have pain mostly left-sided low back.  She did fall to her right, tightly anton her left lumbar spine.  Since that time she complains of worsening of previous condition, tight spasm and pain of left-sided lumbar musculature, but she denies any worsening/change/new numbness, tingling, weakness, or other radicular symptoms.  Low back muscular pain only.    Past Medical History:   Diagnosis Date   • Anxiety 10/14/2019   • Arthritis 2015   • Body mass index 60.0-69.9, adult (CMS/[facility]) 12/6/2016   • Cancer (CMS/[facility]) Uterine 2010   • CTS (carpal tunnel syndrome) 1992   • Depression 11/28/2016   • Diabetes mellitus (CMS/[facility]) 2012   • Essential hypertension 12/17/2013    Overview:  2015 IMO UPDATE   • Hyperlipidemia 10/14/2019   • Hypertension 2010   • Knee pain, right 11/5/2018   • Osteoarthritis Since 2015   • Osteoarthritis of knee 6/13/2017   • Type 2 diabetes mellitus without complications (CMS/[facility]) 11/28/2016       Past Surgical History:   Procedure Laterality Date   • CARPAL TUNNEL RELEASE  1992       Family History   Problem Relation Age of Onset   • Osteoporosis Mother    • Osteoporosis Maternal Grandmother      Social History     Occupational History   • Not on file   Tobacco Use   • Smoking status: Never Smoker   • Smokeless tobacco: Never Used   Substance and Sexual Activity   •  "Alcohol use: No   • Drug use: No   • Sexual activity: Not Currently     Partners: Male      Review of Systems   Constitutional: Negative for activity change and fever.   Musculoskeletal: Positive for arthralgias, back pain and myalgias.   Skin: Negative for color change and rash.   Neurological: Negative for weakness.       Objective:    /86   Pulse 71   Temp 97.3 °F (36.3 °C) (Oral)   Ht 162.6 cm (64\")   Wt (!) 150 kg (330 lb)   BMI 56.64 kg/m²     Physical Examination:  Physical Exam   Constitutional: She appears well-developed and well-nourished. No distress.   overweight   HENT:   Head: Normocephalic and atraumatic.   Eyes: Conjunctivae are normal.   Cardiovascular: Intact distal pulses.   Pulmonary/Chest: Effort normal. No respiratory distress.   Neurological: She is alert.   Skin: Skin is warm. Capillary refill takes less than 2 seconds. She is not diaphoretic.   Nursing note and vitals reviewed.    Back Exam     Tenderness   The patient is experiencing tenderness in the lumbar (Moderate tenderness to palpation and spasm easily palpable on the left-sided lumbar paraspinal musculature.).    Range of Motion   Extension: normal   Flexion: normal   Lateral bend right: abnormal   Lateral bend left: normal     Muscle Strength   Right Quadriceps:  5/5   Left Quadriceps:  5/5   Right Hamstrings:  5/5   Left Hamstrings:  5/5     Tests   Straight leg raise left: negative    Reflexes   Patellar: normal    Other   Sensation: normal  Gait: antalgic   Erythema: no back redness          Imaging and other tests:  No new imaging today.     Assessment and Plan:    1. Lumbar paraspinal muscle spasm  - cyclobenzaprine (FLEXERIL) 5 MG tablet; Take 1 tablet by mouth 3 (Three) Times a Day As Needed for Muscle Spasms.  Dispense: 30 tablet; Refill: 0    I discussed with the patient that with known diabetes mellitus and already having used previous steroid, I would like to stay away from this for preservation of " "normoglycemia.  Otherwise, we will dispense stronger muscle relaxer in the form of Flexeril, okay to take once nightly.  May advance to 3 times daily if needed to break spasm cycle.  RTC in 2-4 weeks if no improvement.    Armond BENJAMIN \"Chance\" Miguel DURANT DO, CAQSM  05/20/20  09:25    Disclaimer: Please note that areas of this note were completed with computer voice recognition software.  Quite often unanticipated grammatical, syntax, homophones, and other interpretive errors are inadvertently transcribed by the computer software. Please excuse any errors that have escaped final proofreading.  "

## 2020-07-02 ENCOUNTER — TRANSCRIBE ORDERS (OUTPATIENT)
Dept: ADMINISTRATIVE | Facility: HOSPITAL | Age: 55
End: 2020-07-02

## 2020-07-02 DIAGNOSIS — E11.9 DIABETES TYPE 2, NO OCULAR INVOLVEMENT (HCC): Primary | ICD-10-CM

## 2020-07-17 ENCOUNTER — HOSPITAL ENCOUNTER (OUTPATIENT)
Dept: CARDIOLOGY | Facility: HOSPITAL | Age: 55
Discharge: HOME | End: 2020-07-17
Admitting: OPHTHALMOLOGY

## 2020-07-17 ENCOUNTER — HOSPITAL ENCOUNTER (OUTPATIENT)
Dept: CARDIOLOGY | Facility: HOSPITAL | Age: 55
Discharge: HOME | End: 2020-07-17

## 2020-07-17 VITALS
DIASTOLIC BLOOD PRESSURE: 105 MMHG | BODY MASS INDEX: 50.02 KG/M2 | SYSTOLIC BLOOD PRESSURE: 190 MMHG | HEIGHT: 64 IN | HEART RATE: 72 BPM | WEIGHT: 293 LBS

## 2020-07-17 DIAGNOSIS — E11.9 DIABETES TYPE 2, NO OCULAR INVOLVEMENT (HCC): ICD-10-CM

## 2020-07-17 PROCEDURE — 93880 EXTRACRANIAL BILAT STUDY: CPT

## 2020-07-17 PROCEDURE — 93306 TTE W/DOPPLER COMPLETE: CPT

## 2020-07-21 LAB
ASCENDING AORTA: 3.8 CM
BH CV ECHO MEAS - ACS: 2.5 CM
BH CV ECHO MEAS - AO MAX PG (FULL): 1.7 MMHG
BH CV ECHO MEAS - AO MAX PG: 9.9 MMHG
BH CV ECHO MEAS - AO MEAN PG (FULL): 0.68 MMHG
BH CV ECHO MEAS - AO MEAN PG: 4.9 MMHG
BH CV ECHO MEAS - AO ROOT AREA (BSA CORRECTED): 1.4
BH CV ECHO MEAS - AO ROOT AREA: 8.4 CM^2
BH CV ECHO MEAS - AO ROOT DIAM: 3.3 CM
BH CV ECHO MEAS - AO V2 MAX: 157.6 CM/SEC
BH CV ECHO MEAS - AO V2 MEAN: 103.9 CM/SEC
BH CV ECHO MEAS - AO V2 VTI: 33.8 CM
BH CV ECHO MEAS - ASC AORTA: 3.8 CM
BH CV ECHO MEAS - AVA(I,A): 2.9 CM^2
BH CV ECHO MEAS - AVA(I,D): 2.9 CM^2
BH CV ECHO MEAS - AVA(V,A): 2.8 CM^2
BH CV ECHO MEAS - AVA(V,D): 2.8 CM^2
BH CV ECHO MEAS - BSA(HAYCOCK): 2.7 M^2
BH CV ECHO MEAS - BSA: 2.4 M^2
BH CV ECHO MEAS - BZI_BMI: 56.6 KILOGRAMS/M^2
BH CV ECHO MEAS - BZI_METRIC_HEIGHT: 162.6 CM
BH CV ECHO MEAS - BZI_METRIC_WEIGHT: 149.7 KG
BH CV ECHO MEAS - EDV(CUBED): 141.3 ML
BH CV ECHO MEAS - EDV(MOD-SP2): 94.9 ML
BH CV ECHO MEAS - EDV(MOD-SP4): 118.7 ML
BH CV ECHO MEAS - EDV(TEICH): 130 ML
BH CV ECHO MEAS - EF(CUBED): 71.8 %
BH CV ECHO MEAS - EF(MOD-BP): 56 %
BH CV ECHO MEAS - EF(MOD-SP2): 57 %
BH CV ECHO MEAS - EF(MOD-SP4): 57.3 %
BH CV ECHO MEAS - EF(TEICH): 63.1 %
BH CV ECHO MEAS - ESV(CUBED): 39.9 ML
BH CV ECHO MEAS - ESV(MOD-SP2): 40.8 ML
BH CV ECHO MEAS - ESV(MOD-SP4): 50.7 ML
BH CV ECHO MEAS - ESV(TEICH): 48 ML
BH CV ECHO MEAS - FS: 34.4 %
BH CV ECHO MEAS - IVS/LVPW: 1.3
BH CV ECHO MEAS - IVSD: 1.3 CM
BH CV ECHO MEAS - LA DIMENSION(2D): 4 CM
BH CV ECHO MEAS - LA DIMENSION: 4 CM
BH CV ECHO MEAS - LA/AO: 1.2
BH CV ECHO MEAS - LAT PEAK E' VEL: 7 CM/SEC
BH CV ECHO MEAS - LV DIASTOLIC VOL/BSA (35-75): 49.1 ML/M^2
BH CV ECHO MEAS - LV IVRT: 0.08 SEC
BH CV ECHO MEAS - LV MASS(C)D: 225.9 GRAMS
BH CV ECHO MEAS - LV MASS(C)DI: 93.3 GRAMS/M^2
BH CV ECHO MEAS - LV MAX PG: 8.3 MMHG
BH CV ECHO MEAS - LV MEAN PG: 4.3 MMHG
BH CV ECHO MEAS - LV SYSTOLIC VOL/BSA (12-30): 21 ML/M^2
BH CV ECHO MEAS - LV V1 MAX: 143.7 CM/SEC
BH CV ECHO MEAS - LV V1 MEAN: 96.9 CM/SEC
BH CV ECHO MEAS - LV V1 VTI: 32 CM
BH CV ECHO MEAS - LVIDD: 5.2 CM
BH CV ECHO MEAS - LVIDS: 3.4 CM
BH CV ECHO MEAS - LVOT AREA: 3 CM^2
BH CV ECHO MEAS - LVOT DIAM: 2 CM
BH CV ECHO MEAS - LVPWD: 0.97 CM
BH CV ECHO MEAS - MED PEAK E' VEL: 8 CM/SEC
BH CV ECHO MEAS - MV A MAX VEL: 107.6 CM/SEC
BH CV ECHO MEAS - MV DEC SLOPE: 363.7 CM/SEC^2
BH CV ECHO MEAS - MV DEC TIME: 0.26 SEC
BH CV ECHO MEAS - MV E MAX VEL: 95.8 CM/SEC
BH CV ECHO MEAS - MV E/A: 0.89
BH CV ECHO MEAS - MV MAX PG: 4.8 MMHG
BH CV ECHO MEAS - MV MEAN PG: 1.6 MMHG
BH CV ECHO MEAS - MV P1/2T: 51 MSEC
BH CV ECHO MEAS - MV V2 MAX: 109.5 CM/SEC
BH CV ECHO MEAS - MV V2 MEAN: 56.1 CM/SEC
BH CV ECHO MEAS - MV V2 VTI: 33.4 CM
BH CV ECHO MEAS - MVA(P1/2T): 4.3 CM2
BH CV ECHO MEAS - MVA(VTI): 2.9 CM^2
BH CV ECHO MEAS - PA ACC SLOPE: 417 CM/SEC2
BH CV ECHO MEAS - PA ACC TIME: 0.12 SEC
BH CV ECHO MEAS - PA MAX PG (FULL): 1.3 MMHG
BH CV ECHO MEAS - PA MAX PG: 2.6 MMHG
BH CV ECHO MEAS - PA MEAN PG (FULL): 0.8 MMHG
BH CV ECHO MEAS - PA MEAN PG: 1.5 MMHG
BH CV ECHO MEAS - PA PR(ACCEL): 24.6 MMHG
BH CV ECHO MEAS - PA V2 MAX: 81.4 CM/SEC
BH CV ECHO MEAS - PA V2 MEAN: 59.1 CM/SEC
BH CV ECHO MEAS - PA V2 VTI: 20.4 CM
BH CV ECHO MEAS - PULM A REVS DUR: 0.12 SEC
BH CV ECHO MEAS - PULM A REVS VEL: 33.1 CM/SEC
BH CV ECHO MEAS - PULM DIAS VEL: 48.9 CM/SEC
BH CV ECHO MEAS - PULM S/D: 1.5
BH CV ECHO MEAS - PULM SYS VEL: 71.9 CM/SEC
BH CV ECHO MEAS - RAP SYSTOLE: 8 MMHG
BH CV ECHO MEAS - RV MAX PG: 1.3 MMHG
BH CV ECHO MEAS - RV MEAN PG: 0.73 MMHG
BH CV ECHO MEAS - RV V1 MAX: 57 CM/SEC
BH CV ECHO MEAS - RV V1 MEAN: 40.6 CM/SEC
BH CV ECHO MEAS - RV V1 VTI: 12.8 CM
BH CV ECHO MEAS - RVSP: 29 MMHG
BH CV ECHO MEAS - SI(AO): 117.2 ML/M^2
BH CV ECHO MEAS - SI(CUBED): 41.9 ML/M^2
BH CV ECHO MEAS - SI(LVOT): 40.2 ML/M^2
BH CV ECHO MEAS - SI(MOD-SP2): 22.4 ML/M^2
BH CV ECHO MEAS - SI(MOD-SP4): 28.1 ML/M^2
BH CV ECHO MEAS - SI(TEICH): 33.9 ML/M^2
BH CV ECHO MEAS - SV(AO): 283.6 ML
BH CV ECHO MEAS - SV(CUBED): 101.4 ML
BH CV ECHO MEAS - SV(LVOT): 97.3 ML
BH CV ECHO MEAS - SV(MOD-SP2): 54.1 ML
BH CV ECHO MEAS - SV(MOD-SP4): 68 ML
BH CV ECHO MEAS - SV(TEICH): 82 ML
BH CV ECHO MEAS - TAPSE (>1.6): 2.7 CM2
BH CV ECHO MEAS - TR MAX PG: 21 MMHG
BH CV ECHO MEAS - TR MAX VEL: 229.1 CM/SEC
BH CV ECHO MEASUREMENTS AVERAGE E/E' RATIO: 12.77
BH CV XLRA - RV BASE: 3.8 CM
BH CV XLRA - RV MID: 2.6 CM
BH CV XLRA - TDI S': 15 CM/SEC
IVRT: 83 MSEC
LEFT ATRIUM VOLUME INDEX: 31 ML/M2
LEFT ATRIUM VOLUME: 76 CM3
LV EF 2D ECHO EST: 55 %

## 2020-07-21 PROCEDURE — 93306 TTE W/DOPPLER COMPLETE: CPT | Performed by: INTERNAL MEDICINE

## 2020-07-22 LAB
BH CV ECHO MEAS - BSA(HAYCOCK): 2.7 M^2
BH CV ECHO MEAS - BSA: 2.4 M^2
BH CV ECHO MEAS - BZI_BMI: 56.6 KILOGRAMS/M^2
BH CV ECHO MEAS - BZI_METRIC_HEIGHT: 162.6 CM
BH CV ECHO MEAS - BZI_METRIC_WEIGHT: 149.7 KG
BH CV XLRA MEAS CAROTID RIGHT ICA/CCA DIASTOLIC RATIO: 0.66
BH CV XLRA MEAS LEFT BULB EDV: 15.5 CM/SEC
BH CV XLRA MEAS LEFT BULB PSV: 57.6 CM/SEC
BH CV XLRA MEAS LEFT DIST CCA EDV: 20.9 CM/SEC
BH CV XLRA MEAS LEFT DIST CCA PSV: 81.1 CM/SEC
BH CV XLRA MEAS LEFT DIST ICA EDV: 18.9 CM/SEC
BH CV XLRA MEAS LEFT DIST ICA PSV: 64.9 CM/SEC
BH CV XLRA MEAS LEFT ICA/CCA DIASTOLIC RATIO: 0.93
BH CV XLRA MEAS LEFT ICA/CCA RATIO: 0.6
BH CV XLRA MEAS LEFT MID CCA EDV: 18.4 CM/SEC
BH CV XLRA MEAS LEFT MID CCA PSV: 74.7 CM/SEC
BH CV XLRA MEAS LEFT MID ICA EDV: 14.7 CM/SEC
BH CV XLRA MEAS LEFT MID ICA PSV: 64.5 CM/SEC
BH CV XLRA MEAS LEFT PROX CCA EDV: 20.3 CM/SEC
BH CV XLRA MEAS LEFT PROX CCA PSV: 107.9 CM/SEC
BH CV XLRA MEAS LEFT PROX ECA EDV: 17 CM/SEC
BH CV XLRA MEAS LEFT PROX ECA PSV: 105.2 CM/SEC
BH CV XLRA MEAS LEFT PROX ICA EDV: 15.7 CM/SEC
BH CV XLRA MEAS LEFT PROX ICA PSV: 59.6 CM/SEC
BH CV XLRA MEAS RIGHT BULB EDV: 12.4 CM/SEC
BH CV XLRA MEAS RIGHT BULB PSV: 74.4 CM/SEC
BH CV XLRA MEAS RIGHT DIST CCA EDV: 16.7 CM/SEC
BH CV XLRA MEAS RIGHT DIST CCA PSV: 78.7 CM/SEC
BH CV XLRA MEAS RIGHT DIST ICA EDV: 30.3 CM/SEC
BH CV XLRA MEAS RIGHT DIST ICA PSV: 74.2 CM/SEC
BH CV XLRA MEAS RIGHT ICA/CCA RATIO: 0.76
BH CV XLRA MEAS RIGHT MID CCA EDV: 20.2 CM/SEC
BH CV XLRA MEAS RIGHT MID CCA PSV: 78.3 CM/SEC
BH CV XLRA MEAS RIGHT MID ICA EDV: 17 CM/SEC
BH CV XLRA MEAS RIGHT MID ICA PSV: 50.5 CM/SEC
BH CV XLRA MEAS RIGHT PROX CCA EDV: 19 CM/SEC
BH CV XLRA MEAS RIGHT PROX CCA PSV: 96.8 CM/SEC
BH CV XLRA MEAS RIGHT PROX ECA EDV: 10.5 CM/SEC
BH CV XLRA MEAS RIGHT PROX ECA PSV: 107.3 CM/SEC
BH CV XLRA MEAS RIGHT PROX ICA EDV: 9.1 CM/SEC
BH CV XLRA MEAS RIGHT PROX ICA PSV: 53.7 CM/SEC
BH CV XLRA MEAS RIGHT VERTEBRAL A EDV: 13.7 CM/SEC
BH CV XLRA MEAS RIGHT VERTEBRAL A PSV: 41.5 CM/SEC
LEFT ARM BP: NORMAL MMHG
RIGHT ARM BP: NORMAL MMHG

## 2020-07-22 PROCEDURE — 93880 EXTRACRANIAL BILAT STUDY: CPT | Performed by: INTERNAL MEDICINE

## 2020-07-28 ENCOUNTER — OFFICE VISIT (OUTPATIENT)
Dept: ORTHOPEDIC SURGERY | Facility: CLINIC | Age: 55
End: 2020-07-28

## 2020-07-28 VITALS
DIASTOLIC BLOOD PRESSURE: 63 MMHG | HEART RATE: 68 BPM | WEIGHT: 293 LBS | BODY MASS INDEX: 50.02 KG/M2 | SYSTOLIC BLOOD PRESSURE: 183 MMHG | HEIGHT: 64 IN

## 2020-07-28 DIAGNOSIS — M17.0 PRIMARY OSTEOARTHRITIS OF BOTH KNEES: Primary | ICD-10-CM

## 2020-07-28 PROCEDURE — 20610 DRAIN/INJ JOINT/BURSA W/O US: CPT | Performed by: FAMILY MEDICINE

## 2020-07-28 PROCEDURE — 99213 OFFICE O/P EST LOW 20 MIN: CPT | Performed by: FAMILY MEDICINE

## 2020-07-28 NOTE — PROGRESS NOTES
Primary Care Sports Medicine Office Visit Note     Patient ID: Marco Heath is a 55 y.o. female.    Chief Complaint:  Chief Complaint   Patient presents with   • Left Knee - Follow-up   • Right Knee - Follow-up     HPI:    Ms. Marco Heath is a 55 y.o. female who returns to the clinic today for follow-up evaluation of bilateral knee pain.  She has known osteoarthritic disease in bilateral knees.  Most recently she was seen on 4/26/2020.  At that time she was for corticosteroid injection bilateral knees.  She states this did incredibly well, and lasted for near 2.5 months.  Unfortunately about 2 weeks ago, she started to notice some deep achy pain about bilateral knees again, right greater than left.  She returns today for follow-up evaluation, but requests repeat corticosteroid injection as well.  No new injury, falls, or trauma.  Same intensity and location of pain as previous.    Past Medical History:   Diagnosis Date   • Anxiety 10/14/2019   • Arthritis 2015   • Body mass index 60.0-69.9, adult (CMS/[facility]) 12/6/2016   • Cancer (CMS/[facility]) Uterine 2010   • CTS (carpal tunnel syndrome) 1992   • Depression 11/28/2016   • Diabetes mellitus (CMS/[facility]) 2012   • Essential hypertension 12/17/2013    Overview:  2015 IMO UPDATE   • Hyperlipidemia 10/14/2019   • Hypertension 2010   • Knee pain, right 11/5/2018   • Osteoarthritis Since 2015   • Osteoarthritis of knee 6/13/2017   • Type 2 diabetes mellitus without complications (CMS/[facility]) 11/28/2016       Past Surgical History:   Procedure Laterality Date   • CARPAL TUNNEL RELEASE  1992       Family History   Problem Relation Age of Onset   • Osteoporosis Mother    • Osteoporosis Maternal Grandmother      Social History     Occupational History   • Not on file   Tobacco Use   • Smoking status: Never Smoker   • Smokeless tobacco: Never Used   Substance and Sexual Activity   • Alcohol use: No   • Drug use: No   • Sexual activity: Not Currently     Partners: Male      Review of  "Systems   Constitutional: Negative for activity change and fever.   Musculoskeletal: Positive for arthralgias.   Skin: Negative for color change and rash.   Neurological: Negative for weakness.       Objective:    BP (!) 183/63   Pulse 68   Ht 162.6 cm (64\")   Wt (!) 152 kg (334 lb)   BMI 57.33 kg/m²     Physical Examination:  Physical Exam   Constitutional: She appears well-developed and well-nourished. No distress.   HENT:   Head: Normocephalic and atraumatic.   Eyes: Conjunctivae are normal.   Cardiovascular: Intact distal pulses.   Pulmonary/Chest: Effort normal. No respiratory distress.   Musculoskeletal:        Right knee: She exhibits no effusion.        Left knee: She exhibits no effusion.   Neurological: She is alert.   Skin: Skin is warm. Capillary refill takes less than 2 seconds. She is not diaphoretic.   Nursing note and vitals reviewed.    Left Ankle Exam     Range of Motion   The patient has normal left ankle ROM.       Right Knee Exam     Muscle Strength   The patient has normal right knee strength.    Tenderness   The patient is experiencing tenderness in the lateral joint line, medial joint line and patella.    Range of Motion   Extension: normal   Flexion: normal Right knee flexion: mildly decreased end ROM due to body habitus.     Tests   Rosalino:  Medial - negative Lateral - negative  Varus: negative Valgus: negative  Right knee patellar apprehension test: +patellar grind, +farzana li.    Other   Erythema: absent  Sensation: normal  Pulse: present (distal to the knee, DP and PT palpable)  Swelling: none  Effusion: no effusion present      Left Knee Exam     Muscle Strength   The patient has normal left knee strength.    Tenderness   The patient is experiencing tenderness in the lateral joint line, medial joint line and patella.    Range of Motion   Extension: abnormal   Flexion: abnormal Left knee flexion: mildly decreased end ROM due to body habitus.     Tests   Rosalino:  Medial - " "negative Lateral - negative  Varus: negative Valgus: negative  Left knee patellar apprehension test: +patellar grind testing, +farzana li testing.    Other   Erythema: absent  Sensation: normal  Pulse: present (distal to the knee, DP and PT palpable)  Swelling: none  Effusion: no effusion present        Imaging and other tests:  No new imaging today.     Assessment and Plan:    1. Primary osteoarthritis of both knees    After discussion of risks and benefits, the patient elected to proceed with corticosteroid injection to the bilateral knees.  The patient tolerated this procedure well without any complaints or problems.  I recommended continuation of conservative management as previous, RTC in 3-6 months or sooner if symptoms recur.    Armond BENJAMIN \"Chance\" Miguel DUARNT DO, CAQSM  07/29/20  16:34    Disclaimer: Please note that areas of this note were completed with computer voice recognition software.  Quite often unanticipated grammatical, syntax, homophones, and other interpretive errors are inadvertently transcribed by the computer software. Please excuse any errors that have escaped final proofreading.  "

## 2020-07-29 PROCEDURE — 20610 DRAIN/INJ JOINT/BURSA W/O US: CPT | Performed by: FAMILY MEDICINE

## 2020-07-29 RX ADMIN — TRIAMCINOLONE ACETONIDE 80 MG: 40 INJECTION, SUSPENSION INTRA-ARTICULAR; INTRAMUSCULAR at 16:34

## 2020-07-29 NOTE — PROGRESS NOTES
Procedure   Large Joint Arthrocentesis  Date/Time: 7/29/2020 4:34 PM  Consent given by: patient  Timeout: Immediately prior to procedure a time out was called to verify the correct patient, procedure, equipment, support staff and site/side marked as required   Supporting Documentation  Indications: pain   Procedure Details  Location: knee - Knee joint: Bilateral knees.  Preparation: Patient was prepped and draped in the usual sterile fashion  Medications administered: 80 mg triamcinolone acetonide 40 MG/ML (2cc of 1% lidocaine without epinepherine, and 2cc of 40mg Kenalog)  Patient tolerance: patient tolerated the procedure well with no immediate complications (Blood loss negligable, pt admits to immediate decrease in pain and improved ROM with gentle ambulation post injection.)

## 2020-08-04 RX ORDER — TRIAMCINOLONE ACETONIDE 40 MG/ML
80 INJECTION, SUSPENSION INTRA-ARTICULAR; INTRAMUSCULAR
Status: COMPLETED | OUTPATIENT
Start: 2020-07-29 | End: 2020-07-29

## 2020-11-02 ENCOUNTER — OFFICE VISIT (OUTPATIENT)
Dept: ORTHOPEDIC SURGERY | Facility: CLINIC | Age: 55
End: 2020-11-02

## 2020-11-02 VITALS
WEIGHT: 293 LBS | DIASTOLIC BLOOD PRESSURE: 91 MMHG | BODY MASS INDEX: 50.02 KG/M2 | SYSTOLIC BLOOD PRESSURE: 147 MMHG | HEIGHT: 64 IN | HEART RATE: 66 BPM

## 2020-11-02 DIAGNOSIS — M17.0 BILATERAL PRIMARY OSTEOARTHRITIS OF KNEE: ICD-10-CM

## 2020-11-02 DIAGNOSIS — M17.0 PRIMARY OSTEOARTHRITIS OF BOTH KNEES: Primary | ICD-10-CM

## 2020-11-02 PROCEDURE — 20610 DRAIN/INJ JOINT/BURSA W/O US: CPT | Performed by: FAMILY MEDICINE

## 2020-11-02 PROCEDURE — 99213 OFFICE O/P EST LOW 20 MIN: CPT | Performed by: FAMILY MEDICINE

## 2020-11-02 RX ORDER — TRIAMCINOLONE ACETONIDE 40 MG/ML
80 INJECTION, SUSPENSION INTRA-ARTICULAR; INTRAMUSCULAR
Status: COMPLETED | OUTPATIENT
Start: 2020-11-02 | End: 2020-11-02

## 2020-11-02 RX ORDER — MELOXICAM 15 MG/1
15 TABLET ORAL DAILY PRN
Qty: 30 TABLET | Refills: 4 | Status: SHIPPED | OUTPATIENT
Start: 2020-11-02 | End: 2021-03-17 | Stop reason: SDUPTHER

## 2020-11-02 RX ADMIN — TRIAMCINOLONE ACETONIDE 80 MG: 40 INJECTION, SUSPENSION INTRA-ARTICULAR; INTRAMUSCULAR at 15:38

## 2020-11-02 NOTE — PROGRESS NOTES
Procedure   Large Joint Arthrocentesis  Date/Time: 11/2/2020 3:38 PM  Consent given by: patient  Site marked: site marked  Timeout: Immediately prior to procedure a time out was called to verify the correct patient, procedure, equipment, support staff and site/side marked as required   Supporting Documentation  Indications: pain   Procedure Details  Location: knee - Knee joint: Bilateral knees.  Preparation: Patient was prepped and draped in the usual sterile fashion  Needle size: 25 G  Approach: anteromedial  Medications administered: 80 mg triamcinolone acetonide 40 MG/ML (2cc of 1% lidocaine without epinepherine, and 2cc of 40mg Kenalog)  Patient tolerance: patient tolerated the procedure well with no immediate complications (Blood loss negligable, pt admits to immediate decrease in pain and improved ROM with gentle ambulation post injection.)

## 2020-11-02 NOTE — PROGRESS NOTES
Primary Care Sports Medicine Office Visit Note     Patient ID: Marco Heath is a 55 y.o. female.    Chief Complaint:  Chief Complaint   Patient presents with   • Left Knee - Follow-up   • Right Knee - Follow-up     HPI:    Ms. Marco Heath is a 55 y.o. female who returns to the clinic today for follow-up evaluation of bilateral knee pain.  The patient has known osteoarthritis, and has recently received corticosteroid injections to help alleviate the pain in both of her knees.  Her most recent injection was 7/28/2020.  She states that this injection lasted a little over 2 months, incredibly well.  Unfortunately it has now worn off.  She has not had any new injuries or falls to either knee.  Pain is similar in location and intensity as previous.  Requests repeat injection today.    Past Medical History:   Diagnosis Date   • Anxiety 10/14/2019   • Arthritis 2015   • Body mass index 60.0-69.9, adult (CMS/[facility]) 12/6/2016   • Cancer (CMS/[facility]) Uterine 2010   • CTS (carpal tunnel syndrome) 1992   • Depression 11/28/2016   • Diabetes mellitus (CMS/[facility]) 2012   • Essential hypertension 12/17/2013    Overview:  2015 IMO UPDATE   • Hyperlipidemia 10/14/2019   • Hypertension 2010   • Knee pain, right 11/5/2018   • Osteoarthritis Since 2015   • Osteoarthritis of knee 6/13/2017   • Type 2 diabetes mellitus without complications (CMS/[facility]) 11/28/2016       Past Surgical History:   Procedure Laterality Date   • CARPAL TUNNEL RELEASE  1992       Family History   Problem Relation Age of Onset   • Osteoporosis Mother    • Osteoporosis Maternal Grandmother      Social History     Occupational History   • Not on file   Tobacco Use   • Smoking status: Never Smoker   • Smokeless tobacco: Never Used   Substance and Sexual Activity   • Alcohol use: No   • Drug use: No   • Sexual activity: Not Currently     Partners: Male      Review of Systems   Constitutional: Negative for activity change and fever.   Musculoskeletal: Positive for arthralgias.  "  Skin: Negative for color change and rash.   Neurological: Negative for weakness.       Objective:    /91   Pulse 66   Ht 162.6 cm (64\")   Wt (!) 151 kg (332 lb)   BMI 56.99 kg/m²     Physical Examination:  Physical Exam  Vitals signs and nursing note reviewed.   Constitutional:       General: She is not in acute distress.     Appearance: She is well-developed. She is not diaphoretic.   HENT:      Head: Normocephalic and atraumatic.   Eyes:      Conjunctiva/sclera: Conjunctivae normal.   Pulmonary:      Effort: Pulmonary effort is normal. No respiratory distress.   Musculoskeletal:      Right knee: She exhibits no effusion.      Left knee: She exhibits no effusion.   Skin:     General: Skin is warm.      Capillary Refill: Capillary refill takes less than 2 seconds.   Neurological:      Mental Status: She is alert.       Right Ankle Exam     Range of Motion   The patient has normal right ankle ROM.      Left Ankle Exam     Range of Motion   The patient has normal left ankle ROM.       Right Knee Exam     Muscle Strength   The patient has normal right knee strength.    Tenderness   The patient is experiencing tenderness in the lateral joint line and medial joint line.    Range of Motion   Extension: normal   Flexion: normal     Tests   Right knee patellar apprehension test: +patellar grind, +farzana li.    Other   Erythema: absent  Sensation: normal  Pulse: present (distal to the knee, DP and PT palpable)  Swelling: none  Effusion: no effusion present      Left Knee Exam     Muscle Strength   The patient has normal left knee strength.    Tenderness   The patient is experiencing tenderness in the lateral joint line and medial joint line.    Range of Motion   Extension: normal   Flexion: normal     Tests   Left knee patellar apprehension test: +patellar grind testing, +farzana li testing.    Other   Erythema: absent  Sensation: normal  Pulse: present (distal to the knee, DP and PT palpable)  Swelling: " "none  Effusion: no effusion present          Imaging and other tests:  No new imaging today.    Assessment and Plan:    1. Primary osteoarthritis of both knees    After discussion of risks and benefits, the patient elected to proceed with corticosteroid injection to the bilateral knees.  The patient tolerated this procedure well without any complaints or problems.  I recommended continuation of conservative management as previous, RTC in 3-6 months or sooner if symptoms recur.    Armond BENJAMIN \"Chance\" Miguel DURANT DO, CAQSM  11/02/20  15:38 EST    Disclaimer: Please note that areas of this note were completed with computer voice recognition software.  Quite often unanticipated grammatical, syntax, homophones, and other interpretive errors are inadvertently transcribed by the computer software. Please excuse any errors that have escaped final proofreading.  "

## 2020-11-13 ENCOUNTER — TRANSCRIBE ORDERS (OUTPATIENT)
Dept: ADMINISTRATIVE | Facility: HOSPITAL | Age: 55
End: 2020-11-13

## 2020-11-13 DIAGNOSIS — Z12.31 VISIT FOR SCREENING MAMMOGRAM: Primary | ICD-10-CM

## 2020-12-04 ENCOUNTER — HOSPITAL ENCOUNTER (OUTPATIENT)
Dept: MAMMOGRAPHY | Facility: HOSPITAL | Age: 55
Discharge: HOME | End: 2020-12-04
Admitting: NURSE PRACTITIONER

## 2020-12-04 DIAGNOSIS — Z12.31 VISIT FOR SCREENING MAMMOGRAM: ICD-10-CM

## 2020-12-04 PROCEDURE — 77067 SCR MAMMO BI INCL CAD: CPT

## 2020-12-04 PROCEDURE — 77063 BREAST TOMOSYNTHESIS BI: CPT

## 2021-02-03 ENCOUNTER — OFFICE VISIT (OUTPATIENT)
Dept: ORTHOPEDIC SURGERY | Facility: CLINIC | Age: 56
End: 2021-02-03

## 2021-02-03 VITALS
BODY MASS INDEX: 50.02 KG/M2 | SYSTOLIC BLOOD PRESSURE: 152 MMHG | DIASTOLIC BLOOD PRESSURE: 83 MMHG | HEIGHT: 64 IN | HEART RATE: 69 BPM | WEIGHT: 293 LBS

## 2021-02-03 DIAGNOSIS — M17.0 BILATERAL PRIMARY OSTEOARTHRITIS OF KNEE: Primary | ICD-10-CM

## 2021-02-03 PROCEDURE — 20610 DRAIN/INJ JOINT/BURSA W/O US: CPT | Performed by: FAMILY MEDICINE

## 2021-02-03 RX ORDER — INSULIN GLARGINE 300 U/ML
10 INJECTION, SOLUTION SUBCUTANEOUS DAILY
COMMUNITY
Start: 2020-11-11 | End: 2021-03-15 | Stop reason: ALTCHOICE

## 2021-02-03 RX ORDER — TRIAMCINOLONE ACETONIDE 40 MG/ML
80 INJECTION, SUSPENSION INTRA-ARTICULAR; INTRAMUSCULAR
Status: COMPLETED | OUTPATIENT
Start: 2021-02-03 | End: 2021-02-03

## 2021-02-03 RX ADMIN — TRIAMCINOLONE ACETONIDE 80 MG: 40 INJECTION, SUSPENSION INTRA-ARTICULAR; INTRAMUSCULAR at 16:16

## 2021-02-03 NOTE — PROGRESS NOTES
Primary Care Sports Medicine Office Visit Note     Patient ID: Marco Heath is a 56 y.o. female.    Chief Complaint:  Chief Complaint   Patient presents with   • Left Knee - Follow-up   • Right Knee - Follow-up     HPI:    Ms. Marco Heath is a 56 y.o. female who presents to the clinic today for follow up evaluation of bilateral knee pain. She states that since she had injections to bilateral knees on 11/2/2020 (3 months ago), knee pain has done well. She had near 1 month of complete relief. And then slow/insdious return of pain over the next one month. Her moderate to severe achy pain has been present now for about 1 month. She denies any fall, injury or new trauma. Knee pain is similar in location and severity as previous. Requests repeat corticosteroid injection today.     Past Medical History:   Diagnosis Date   • Anxiety 10/14/2019   • Arthritis 2015   • Body mass index 60.0-69.9, adult (CMS/[facility]) 12/6/2016   • Cancer (CMS/[facility]) Uterine 2010   • CTS (carpal tunnel syndrome) 1992   • Depression 11/28/2016   • Diabetes mellitus (CMS/[facility]) 2012   • Essential hypertension 12/17/2013    Overview:  2015 IMO UPDATE   • Hyperlipidemia 10/14/2019   • Hypertension 2010   • Knee pain, right 11/5/2018   • Osteoarthritis Since 2015   • Osteoarthritis of knee 6/13/2017   • Type 2 diabetes mellitus without complications (CMS/[facility]) 11/28/2016       Past Surgical History:   Procedure Laterality Date   • CARPAL TUNNEL RELEASE  1992       Family History   Problem Relation Age of Onset   • Osteoporosis Mother    • Osteoporosis Maternal Grandmother      Social History     Occupational History   • Not on file   Tobacco Use   • Smoking status: Never Smoker   • Smokeless tobacco: Never Used   Substance and Sexual Activity   • Alcohol use: No   • Drug use: No   • Sexual activity: Not Currently     Partners: Male      Review of Systems   Constitutional: Negative for activity change and fever.   Musculoskeletal: Positive for arthralgias.  "  Skin: Negative for color change and rash.   Neurological: Negative for weakness.     Objective:    /83   Pulse 69   Ht 162.6 cm (64\")   Wt (!) 150 kg (330 lb)   BMI 56.64 kg/m²     Physical Examination:  Physical Exam  Vitals signs and nursing note reviewed.   Constitutional:       General: She is not in acute distress.     Appearance: She is well-developed. She is not diaphoretic.   HENT:      Head: Normocephalic and atraumatic.   Eyes:      Conjunctiva/sclera: Conjunctivae normal.   Pulmonary:      Effort: Pulmonary effort is normal. No respiratory distress.   Musculoskeletal:      Right knee: She exhibits no effusion.      Left knee: She exhibits no effusion.   Skin:     General: Skin is warm.      Capillary Refill: Capillary refill takes less than 2 seconds.   Neurological:      Mental Status: She is alert.       Right Ankle Exam     Range of Motion   The patient has normal right ankle ROM.      Left Ankle Exam     Range of Motion   The patient has normal left ankle ROM.       Right Knee Exam     Muscle Strength   The patient has normal right knee strength.    Tenderness   The patient is experiencing tenderness in the lateral joint line and medial joint line.    Range of Motion   Extension:  10 (due to body habitus) abnormal   Flexion: normal     Tests   Right knee patellar apprehension test: +patellar grind, +farzana li.    Other   Erythema: absent  Sensation: normal  Pulse: present (distal to the knee, DP and PT palpable)  Swelling: none  Effusion: no effusion present      Left Knee Exam     Muscle Strength   The patient has normal left knee strength.    Tenderness   The patient is experiencing tenderness in the lateral joint line and medial joint line.    Range of Motion   Extension:  10 (due to body habitus) abnormal   Flexion: normal     Tests   Left knee patellar apprehension test: +patellar grind testing, +farzana li testing.    Other   Erythema: absent  Sensation: normal  Pulse: present " "(distal to the knee, DP and PT palpable)  Swelling: none  Effusion: no effusion present        Imaging and other tests:  No new imaging.     Assessment and Plan:    1. Bilateral primary osteoarthritis of knee  - Large Joint Arthrocentesis    After discussion of risks and benefits, the patient elected to proceed with corticosteroid injection to the bilateral knees.  The patient tolerated this procedure well without any complaints or problems.  I recommended continuation of conservative management as previous, RTC in 3-6 months or sooner if symptoms recur.    Armond BENJAMIN \"Chance\" Miguel DURANT DO, CAQSM  02/03/21  16:20 EST    Disclaimer: Please note that areas of this note were completed with computer voice recognition software.  Quite often unanticipated grammatical, syntax, homophones, and other interpretive errors are inadvertently transcribed by the computer software. Please excuse any errors that have escaped final proofreading.  "

## 2021-02-03 NOTE — PROGRESS NOTES
Procedure   Large Joint Arthrocentesis  Date/Time: 2/3/2021 4:16 PM  Consent given by: patient  Site marked: site marked  Timeout: Immediately prior to procedure a time out was called to verify the correct patient, procedure, equipment, support staff and site/side marked as required   Supporting Documentation  Indications: pain   Procedure Details  Location: knee - Knee joint: bilateral knees.  Preparation: Patient was prepped and draped in the usual sterile fashion  Needle size: 25 G  Approach: anteromedial  Medications administered: 80 mg triamcinolone acetonide 40 MG/ML (2cc of 1% lidocaine without epinepherine, and 2cc of 40mg Kenalog)  Patient tolerance: patient tolerated the procedure well with no immediate complications (Blood loss negligable, pt admits to immediate decrease in pain and improved ROM with gentle ambulation post injection.)

## 2021-03-07 ENCOUNTER — IMMUNIZATION (OUTPATIENT)
Dept: VACCINE CLINIC | Facility: HOSPITAL | Age: 56
End: 2021-03-07

## 2021-03-07 PROCEDURE — 0001A: CPT | Performed by: INTERNAL MEDICINE

## 2021-03-07 PROCEDURE — 91300 HC SARSCOV02 VAC 30MCG/0.3ML IM: CPT | Performed by: INTERNAL MEDICINE

## 2021-03-09 NOTE — PROGRESS NOTES
Sleep medicine follow-up visit    Marco LYNETTE Heath   1965  56 y.o. female   DATE OF SERVICE: 3/15/2021     Patient is here for follow up on JIMMY, patient uses full face- gets supplies from [employer]  Patient wants new cpap , machine is over 10 years.     SLEEP TESTING HISTORY:    Pt. was dx with sleep apnea 2009  ahi 52 in 2009    The compliance data reviewed and the patient is on Bipap therapy at IPAP 16.0 and EPAP 12.0 cm/H2O and compliance data indicates excellent compliance with 100% usage for more than 4 hours with an average usage of 8 hours 39  minutes. AHI down to 1.8.   The patient's hypersomnia has improved with [place] Sleepiness Scale score of 16 with CPAP therapy.  The patient feels better and is benefiting from it and is compliant.   She is due for a new machine.       EPWORTH SLEEPINESS SCALE  Sitting and reading 3 WatchingTV 3  Sitting, inactive, in a public place 0  As a passenger in a car for 1 hour w/o a break  3  Lying down to rest in the afternoon  3  Sitting and talking to someone  0  Sitting quietly after a lunch  3  In a car, while stopped for traffic or a light  0  Total 16      Review of Systems   Constitutional: Negative for fatigue and fever.   HENT: Negative for ear discharge and ear pain.    Eyes: Positive for redness. Negative for pain.   Respiratory: Negative for cough and shortness of breath.    Cardiovascular: Negative for chest pain.   Gastrointestinal: Positive for abdominal pain and nausea.   Endocrine: Positive for cold intolerance. Negative for heat intolerance.   Genitourinary: Negative for urgency.   Musculoskeletal: Positive for back pain. Negative for neck pain and neck stiffness.   Neurological: Positive for dizziness, light-headedness and headaches.   Psychiatric/Behavioral: Negative for agitation, behavioral problems, confusion and sleep disturbance.     I reviewed and addressed ROS entered by MA.    History of Present Illness    The following portions of the  patient's history were reviewed and updated as appropriate: allergies, current medications, past family history, past medical history, past social history, past surgical history and problem list.      Family History   Problem Relation Age of Onset   • Osteoporosis Mother    • Osteoporosis Maternal Grandmother        Past Medical History:   Diagnosis Date   • Anxiety 10/14/2019   • Arthritis 2015   • Body mass index 60.0-69.9, adult (CMS/[facility]) 12/6/2016   • Cancer (CMS/HCC) Uterine 2010   • CTS (carpal tunnel syndrome) 1992   • Depression 11/28/2016   • Diabetes mellitus (CMS/HCC) 2012   • Essential hypertension 12/17/2013    Overview:  2015 IMO UPDATE   • Hyperlipidemia 10/14/2019   • Hypertension 2010   • Knee pain, right 11/5/2018   • Osteoarthritis Since 2015   • Osteoarthritis of knee 6/13/2017   • Type 2 diabetes mellitus without complications (CMS/[facility]) 11/28/2016       Social History     Socioeconomic History   • Marital status:      Spouse name: Not on file   • Number of children: Not on file   • Years of education: Not on file   • Highest education level: Not on file   Tobacco Use   • Smoking status: Never Smoker   • Smokeless tobacco: Never Used   Vaping Use   • Vaping Use: Never used   Substance and Sexual Activity   • Alcohol use: No   • Drug use: No   • Sexual activity: Not Currently     Partners: Male         Current Outpatient Medications:   •  acetaminophen (TYLENOL) 650 MG 8 hr tablet, Take 650 mg by mouth., Disp: , Rfl:   •  dapagliflozin (Farxiga) 5 MG tablet tablet, Take 1 tablet by mouth daily., Disp: 90 tablet, Rfl: 0  •  fluconazole (DIFLUCAN) 150 MG tablet, Take 1 tablet by mouth on day 1, day 3 and day 7., Disp: 3 tablet, Rfl: 2  •  FLUoxetine (PROzac) 20 MG capsule, Take 1 capsule by mouth daily, Disp: 90 capsule, Rfl: 1  •  Insulin Degludec (Tresiba FlexTouch) 200 UNIT/ML solution pen-injector pen injection, Inject 20 Units into the skin nightly., Disp: 9 mL, Rfl: 5  •  Insulin  Degludec (TRESIBA FLEXTOUCH) 200 UNIT/ML solution pen-injector pen injection, Inject 20 Units under the skin into the appropriate area as directed Daily., Disp: , Rfl:   •  losartan (COZAAR) 25 MG tablet, Take 2 tablets by mouth Daily., Disp: 180 tablet, Rfl: 3  •  meloxicam (MOBIC) 15 MG tablet, Take 1 tablet by mouth Daily As Needed for Mild Pain ., Disp: 30 tablet, Rfl: 4  •  metFORMIN ER (GLUCOPHAGE-XR) 500 MG 24 hr tablet, Take 2 tablets by mouth daily with dinner, Disp: 180 tablet, Rfl: 1  •  Omega-3 Fatty Acids (FISH OIL) 1000 MG capsule capsule, Take  by mouth Daily With Breakfast., Disp: , Rfl:   •  pravastatin (PRAVACHOL) 20 MG tablet, Take 1 tablet by mouth daily for 90 days., Disp: 90 tablet, Rfl: 3  •  Insulin Glargine, 1 Unit Dial, (Toujeo SoloStar) 300 UNIT/ML solution pen-injector injection, Inject 10 Units under the skin into the appropriate area as directed Daily., Disp: , Rfl:     Allergies   Allergen Reactions   • Amoxicillin Hives   • Penicillins Unknown (See Comments) and Hives     Not specified on transfer form        PHYSICAL EXAMINATION:  Vitals:    03/15/21 1617   BP: 176/96   Pulse: 73   Temp: 98.2 °F (36.8 °C)      Body mass index is 57.16 kg/m².       HEENT: Normal.    CARDIAC: Normal.   LUNGS: Clear to auscultation.   EXTREMITIES: No edema.     Diagnoses and all orders for this visit:    1. Obstructive sleep apnea (Primary)  Comments:  Continue using 4-6 hrs per night, New Mask, Tubing and Filters, Stop Tylenol PM use plain Tylenol, Will attemtp to get new Machine current machine was from 2009           Return in about 6 months (around 9/15/2021) for Next scheduled follow up Dr Jospeh Seipel .      This document has been electronically signed by Katharina GONZALEZ on March 15, 2021 16:55 EDT

## 2021-03-15 ENCOUNTER — OFFICE VISIT (OUTPATIENT)
Dept: NEUROLOGY | Facility: CLINIC | Age: 56
End: 2021-03-15

## 2021-03-15 VITALS
HEIGHT: 64 IN | WEIGHT: 293 LBS | BODY MASS INDEX: 50.02 KG/M2 | TEMPERATURE: 98.2 F | HEART RATE: 73 BPM | SYSTOLIC BLOOD PRESSURE: 176 MMHG | DIASTOLIC BLOOD PRESSURE: 96 MMHG

## 2021-03-15 DIAGNOSIS — G47.33 OBSTRUCTIVE SLEEP APNEA: Primary | Chronic | ICD-10-CM

## 2021-03-15 PROCEDURE — 99212 OFFICE O/P EST SF 10 MIN: CPT | Performed by: NURSE PRACTITIONER

## 2021-03-17 DIAGNOSIS — M17.0 BILATERAL PRIMARY OSTEOARTHRITIS OF KNEE: ICD-10-CM

## 2021-03-17 RX ORDER — MELOXICAM 15 MG/1
15 TABLET ORAL DAILY PRN
Qty: 30 TABLET | Refills: 4 | Status: SHIPPED | OUTPATIENT
Start: 2021-03-17 | End: 2021-11-11

## 2021-03-18 ENCOUNTER — TELEPHONE (OUTPATIENT)
Dept: NEUROLOGY | Facility: CLINIC | Age: 56
End: 2021-03-18

## 2021-03-18 DIAGNOSIS — G47.33 OBSTRUCTIVE SLEEP APNEA: Primary | ICD-10-CM

## 2021-03-18 NOTE — TELEPHONE ENCOUNTER
----- Message from GARRICK Cobb sent at 3/15/2021  4:41 PM EDT -----  Needs  Masks, Tubing and Filters.    She is interested in a new machine and is worried about cost after insurance. Please call with out of pocket cost.

## 2021-03-28 ENCOUNTER — IMMUNIZATION (OUTPATIENT)
Dept: VACCINE CLINIC | Facility: HOSPITAL | Age: 56
End: 2021-03-28

## 2021-03-28 PROCEDURE — 0002A: CPT | Performed by: INTERNAL MEDICINE

## 2021-03-28 PROCEDURE — 91300 HC SARSCOV02 VAC 30MCG/0.3ML IM: CPT | Performed by: INTERNAL MEDICINE

## 2021-03-30 ENCOUNTER — TELEPHONE (OUTPATIENT)
Dept: NEUROLOGY | Facility: CLINIC | Age: 56
End: 2021-03-30

## 2021-05-07 ENCOUNTER — OFFICE VISIT (OUTPATIENT)
Dept: ORTHOPEDIC SURGERY | Facility: CLINIC | Age: 56
End: 2021-05-07

## 2021-05-07 VITALS
SYSTOLIC BLOOD PRESSURE: 168 MMHG | HEIGHT: 64 IN | DIASTOLIC BLOOD PRESSURE: 93 MMHG | WEIGHT: 293 LBS | HEART RATE: 77 BPM | BODY MASS INDEX: 50.02 KG/M2

## 2021-05-07 DIAGNOSIS — M25.562 ACUTE PAIN OF LEFT KNEE: Primary | ICD-10-CM

## 2021-05-07 DIAGNOSIS — M17.0 PRIMARY OSTEOARTHRITIS OF BOTH KNEES: ICD-10-CM

## 2021-05-07 PROCEDURE — 99213 OFFICE O/P EST LOW 20 MIN: CPT | Performed by: FAMILY MEDICINE

## 2021-05-07 PROCEDURE — 20610 DRAIN/INJ JOINT/BURSA W/O US: CPT | Performed by: FAMILY MEDICINE

## 2021-05-10 RX ORDER — TRIAMCINOLONE ACETONIDE 40 MG/ML
80 INJECTION, SUSPENSION INTRA-ARTICULAR; INTRAMUSCULAR
Status: COMPLETED | OUTPATIENT
Start: 2021-05-10 | End: 2021-05-10

## 2021-05-10 RX ADMIN — TRIAMCINOLONE ACETONIDE 80 MG: 40 INJECTION, SUSPENSION INTRA-ARTICULAR; INTRAMUSCULAR at 17:26

## 2021-06-28 RX ORDER — MELOXICAM 15 MG/1
15 TABLET ORAL DAILY PRN
Qty: 90 TABLET | Refills: 0 | Status: SHIPPED | OUTPATIENT
Start: 2021-06-28 | End: 2021-09-26

## 2021-08-06 ENCOUNTER — OFFICE VISIT (OUTPATIENT)
Dept: ORTHOPEDIC SURGERY | Facility: CLINIC | Age: 56
End: 2021-08-06

## 2021-08-06 VITALS
WEIGHT: 293 LBS | HEART RATE: 73 BPM | HEIGHT: 64 IN | BODY MASS INDEX: 50.02 KG/M2 | DIASTOLIC BLOOD PRESSURE: 92 MMHG | SYSTOLIC BLOOD PRESSURE: 150 MMHG

## 2021-08-06 DIAGNOSIS — M17.0 PRIMARY OSTEOARTHRITIS OF KNEES, BILATERAL: Primary | ICD-10-CM

## 2021-08-06 PROCEDURE — 20610 DRAIN/INJ JOINT/BURSA W/O US: CPT | Performed by: FAMILY MEDICINE

## 2021-08-06 PROCEDURE — 99213 OFFICE O/P EST LOW 20 MIN: CPT | Performed by: FAMILY MEDICINE

## 2021-08-06 RX ADMIN — TRIAMCINOLONE ACETONIDE 80 MG: 40 INJECTION, SUSPENSION INTRA-ARTICULAR; INTRAMUSCULAR at 14:48

## 2021-08-06 NOTE — PROGRESS NOTES
Primary Care Sports Medicine Office Visit Note     Patient ID: Marco Heath is a 56 y.o. female.    Chief Complaint:  Chief Complaint   Patient presents with   • Left Knee - Follow-up   • Right Knee - Follow-up     HPI:    Ms. Marco Heath is a 56 y.o. female who presents to the clinic today for repeat evaluation of bilateral knee pain.  The patient has known osteoarthritic change, and has been fighting pain with bilateral knees for many years.  She most recently had corticosteroid injections bilaterally about 3 months ago.  She states they work incredibly well, but unfortunately wear off.  She had greater than 2 months of relief, but deep achy knee pain has now returned.  Similar in location and severity to previous.  No new injuries or falls.    Past Medical History:   Diagnosis Date   • Anxiety 10/14/2019   • Arthritis 2015   • Body mass index 60.0-69.9, adult (CMS/[facility]) 12/6/2016   • Cancer (CMS/[facility]) Uterine 2010   • CTS (carpal tunnel syndrome) 1992   • Depression 11/28/2016   • Diabetes mellitus (CMS/[facility]) 2012   • Essential hypertension 12/17/2013    Overview:  2015 IMO UPDATE   • Hyperlipidemia 10/14/2019   • Hypertension 2010   • Knee pain, right 11/5/2018   • Osteoarthritis Since 2015   • Osteoarthritis of knee 6/13/2017   • Type 2 diabetes mellitus without complications (CMS/[facility]) 11/28/2016       Past Surgical History:   Procedure Laterality Date   • CARPAL TUNNEL RELEASE  1992       Family History   Problem Relation Age of Onset   • Osteoporosis Mother    • Osteoporosis Maternal Grandmother      Social History     Occupational History   • Not on file   Tobacco Use   • Smoking status: Never Smoker   • Smokeless tobacco: Never Used   Vaping Use   • Vaping Use: Never used   Substance and Sexual Activity   • Alcohol use: No   • Drug use: No   • Sexual activity: Not Currently     Partners: Male      Review of Systems   Musculoskeletal: Positive for arthralgias.     Objective:    /92   Pulse 73   Ht  "162.6 cm (64\")   Wt (!) 144 kg (318 lb)   BMI 54.58 kg/m²     Physical Examination:  Physical Exam  Vitals and nursing note reviewed.   Constitutional:       General: She is not in acute distress.     Appearance: She is well-developed. She is not diaphoretic.   HENT:      Head: Normocephalic and atraumatic.   Eyes:      Conjunctiva/sclera: Conjunctivae normal.   Pulmonary:      Effort: Pulmonary effort is normal. No respiratory distress.   Musculoskeletal:      Right knee: No effusion.      Instability Tests: Medial Rosalino test negative and lateral Rosalino test negative.      Left knee: No effusion.      Instability Tests: Medial Rosalino test negative and lateral Rosalino test negative.   Skin:     General: Skin is warm.      Capillary Refill: Capillary refill takes less than 2 seconds.   Neurological:      Mental Status: She is alert.       Right Ankle Exam     Range of Motion   The patient has normal right ankle ROM.      Left Ankle Exam     Range of Motion   The patient has normal left ankle ROM.       Right Knee Exam     Muscle Strength   The patient has normal right knee strength.    Tenderness   The patient is experiencing tenderness in the lateral joint line, medial joint line and patella.    Range of Motion   Extension: normal   Flexion: normal     Tests   Rosalino:  Medial - negative Lateral - negative  Varus: negative Valgus: negative  Right knee patellar apprehension test: +patellar grind, +farzana li.    Other   Erythema: absent  Sensation: normal  Pulse: present (distal to the knee, DP and PT palpable)  Swelling: none  Effusion: no effusion present      Left Knee Exam     Muscle Strength   The patient has normal left knee strength.    Tenderness   The patient is experiencing tenderness in the lateral joint line, medial joint line and patella.    Range of Motion   Extension: normal   Flexion: normal     Tests   Rosalino:  Medial - negative Lateral - negative  Varus: negative Valgus: " "negative  Left knee patellar apprehension test: +patellar grind testing, +farzana li testing.    Other   Erythema: absent  Sensation: normal  Pulse: present (distal to the knee, DP and PT palpable)  Swelling: none  Effusion: no effusion present        Imaging and other tests:  No new imaging today.  Assessment and Plan:    There are no diagnoses linked to this encounter.    Armond BENJAMIN \"Chance\" Miguel DURANT DO, CAQSM  08/06/21  14:48 EDT    Disclaimer: Please note that areas of this note were completed with computer voice recognition software.  Quite often unanticipated grammatical, syntax, homophones, and other interpretive errors are inadvertently transcribed by the computer software. Please excuse any errors that have escaped final proofreading.  "

## 2021-08-06 NOTE — PROGRESS NOTES
Procedure   Large Joint Arthrocentesis  Date/Time: 8/6/2021 2:48 PM  Consent given by: patient  Site marked: site marked  Timeout: Immediately prior to procedure a time out was called to verify the correct patient, procedure, equipment, support staff and site/side marked as required   Supporting Documentation  Indications: pain   Procedure Details  Location: knee - Knee joint: bilateral knees.  Preparation: Patient was prepped and draped in the usual sterile fashion  Needle size: 25 G  Approach: anteromedial  Medications administered: 80 mg triamcinolone acetonide 40 MG/ML (2cc of 1% lidocaine without epinepherine, and 2cc of 40mg Kenalog)  Patient tolerance: patient tolerated the procedure well with no immediate complications (Blood loss negligable, pt admits to immediate decrease in pain and improved ROM with gentle ambulation post injection.)

## 2021-08-11 RX ORDER — TRIAMCINOLONE ACETONIDE 40 MG/ML
80 INJECTION, SUSPENSION INTRA-ARTICULAR; INTRAMUSCULAR
Status: COMPLETED | OUTPATIENT
Start: 2021-08-06 | End: 2021-08-06

## 2021-08-24 ENCOUNTER — PATIENT MESSAGE (OUTPATIENT)
Dept: ORTHOPEDIC SURGERY | Facility: CLINIC | Age: 56
End: 2021-08-24

## 2021-08-24 DIAGNOSIS — M17.0 PRIMARY OSTEOARTHRITIS OF KNEES, BILATERAL: Primary | ICD-10-CM

## 2021-08-25 RX ORDER — MELOXICAM 15 MG/1
15 TABLET ORAL DAILY
Qty: 90 TABLET | Refills: 1 | Status: SHIPPED | OUTPATIENT
Start: 2021-08-25 | End: 2022-02-15 | Stop reason: SDUPTHER

## 2021-08-25 NOTE — TELEPHONE ENCOUNTER
From: Marco Heath  To: Armond Rivera II, DO  Sent: 8/24/2021 9:00 PM EDT  Subject: Prescription Question    Dr Shell ok'd a 90 supply of meloxicam but he sent it to a pharmacy in [place] could someone please call me in a 90 day refill to [facility] I only have two tablets left.   Thank you   Marco Heath

## 2021-11-11 ENCOUNTER — OFFICE VISIT (OUTPATIENT)
Dept: ORTHOPEDIC SURGERY | Facility: CLINIC | Age: 56
End: 2021-11-11

## 2021-11-11 VITALS — HEIGHT: 64 IN | BODY MASS INDEX: 50.02 KG/M2 | WEIGHT: 293 LBS

## 2021-11-11 DIAGNOSIS — M17.0 PRIMARY OSTEOARTHRITIS OF BOTH KNEES: Primary | ICD-10-CM

## 2021-11-11 PROCEDURE — 20610 DRAIN/INJ JOINT/BURSA W/O US: CPT | Performed by: FAMILY MEDICINE

## 2021-11-11 RX ORDER — TRIAMCINOLONE ACETONIDE 40 MG/ML
80 INJECTION, SUSPENSION INTRA-ARTICULAR; INTRAMUSCULAR
Status: COMPLETED | OUTPATIENT
Start: 2021-11-11 | End: 2021-11-11

## 2021-11-11 RX ADMIN — TRIAMCINOLONE ACETONIDE 80 MG: 40 INJECTION, SUSPENSION INTRA-ARTICULAR; INTRAMUSCULAR at 13:24

## 2021-11-11 NOTE — PROGRESS NOTES
Procedure   Large Joint Arthrocentesis  Date/Time: 11/11/2021 1:24 PM  Consent given by: patient  Site marked: site marked  Timeout: Immediately prior to procedure a time out was called to verify the correct patient, procedure, equipment, support staff and site/side marked as required   Supporting Documentation  Indications: pain   Procedure Details  Location: knee - Knee joint: bilateral knee.  Preparation: Patient was prepped and draped in the usual sterile fashion  Needle size: 25 G  Approach: anteromedial  Medications administered: 80 mg triamcinolone acetonide 40 MG/ML (2cc of 1% lidocaine without epinepherine, and 2cc of 40mg Kenalog)  Patient tolerance: patient tolerated the procedure well with no immediate complications (Blood loss negligable, pt admits to immediate decrease in pain and improved ROM with gentle ambulation post injection.)

## 2021-11-11 NOTE — PROGRESS NOTES
"Primary Care Sports Medicine Office Visit Note     Patient ID: Marco Heath is a 56 y.o. female.    Chief Complaint:  Chief Complaint   Patient presents with   • Left Knee - Follow-up   • Right Knee - Follow-up     HPI:    Ms. Marco Heath is a 56 y.o. female who presents to the clinic today for bilateral knee pain. She states her pain has returned, similar in severity and location. She denies any new injuries or falls.     Past Medical History:   Diagnosis Date   • Anxiety 10/14/2019   • Arthritis 2015   • Body mass index 60.0-69.9, adult (HCC) 12/6/2016   • Cancer ([facility]) Uterine 2010   • CTS (carpal tunnel syndrome) 1992   • Depression 11/28/2016   • Diabetes mellitus ([facility]) 2012   • Essential hypertension 12/17/2013    Overview:  2015 IMO UPDATE   • Hyperlipidemia 10/14/2019   • Hypertension 2010   • Knee pain, right 11/5/2018   • Osteoarthritis Since 2015   • Osteoarthritis of knee 6/13/2017   • Type 2 diabetes mellitus without complications ([facility]) 11/28/2016       Past Surgical History:   Procedure Laterality Date   • CARPAL TUNNEL RELEASE  1992       Family History   Problem Relation Age of Onset   • Osteoporosis Mother    • Osteoporosis Maternal Grandmother      Social History     Occupational History   • Not on file   Tobacco Use   • Smoking status: Never Smoker   • Smokeless tobacco: Never Used   Vaping Use   • Vaping Use: Never used   Substance and Sexual Activity   • Alcohol use: No   • Drug use: No   • Sexual activity: Not Currently     Partners: Male      Review of Systems   Constitutional: Negative for activity change and fever.   Musculoskeletal: Positive for arthralgias.   Skin: Negative for color change and rash.   Neurological: Negative for weakness.     Objective:    Ht 162.6 cm (64\")   Wt (!) 147 kg (325 lb)   BMI 55.79 kg/m²     Physical Examination:  Physical Exam  Vitals and nursing note reviewed.   Constitutional:       General: She is not in acute distress.     Appearance: She is " well-developed. She is not diaphoretic.   HENT:      Head: Normocephalic and atraumatic.   Eyes:      Conjunctiva/sclera: Conjunctivae normal.   Pulmonary:      Effort: Pulmonary effort is normal. No respiratory distress.   Musculoskeletal:      Right knee: No effusion.      Instability Tests: Medial Rosalino test negative and lateral Rosalino test negative.      Left knee: No effusion.      Instability Tests: Medial Rosalino test negative and lateral Rosalino test negative.   Skin:     General: Skin is warm.      Capillary Refill: Capillary refill takes less than 2 seconds.   Neurological:      Mental Status: She is alert.       Right Ankle Exam     Range of Motion   The patient has normal right ankle ROM.      Left Ankle Exam     Range of Motion   The patient has normal left ankle ROM.       Right Knee Exam     Muscle Strength   The patient has normal right knee strength.    Tenderness   The patient is experiencing tenderness in the lateral joint line and medial joint line.    Range of Motion   Extension: normal   Flexion: normal     Tests   Rosalino:  Medial - negative Lateral - negative  Varus: negative Valgus: negative  Right knee patellar apprehension test: +patellar grind, +farzana li.    Other   Erythema: absent  Sensation: normal  Pulse: present (distal to the knee, DP and PT palpable)  Swelling: none  Effusion: no effusion present      Left Knee Exam     Muscle Strength   The patient has normal left knee strength.    Tenderness   The patient is experiencing tenderness in the lateral joint line and medial joint line.    Range of Motion   Extension: normal   Flexion: normal     Tests   Rosalino:  Medial - negative Lateral - negative  Varus: negative Valgus: negative  Left knee patellar apprehension test: +patellar grind testing, +farzana li testing.    Other   Erythema: absent  Sensation: normal  Pulse: present (distal to the knee, DP and PT palpable)  Swelling: none  Effusion: no effusion  "present        Imaging and other tests:  No new imaging today.     Assessment and Plan:    1. Primary osteoarthritis of both knees     After discussion of risks and benefits, the patient elected to proceed with corticosteroid injection to the bilateral knees.  The patient tolerated this procedure well without any complaints or problems.  I recommended continuation of conservative management as previous, RTC in 3-6 months or sooner if symptoms recur.    Armond BENJAMIN \"Chance\" Miguel DURANT DO, CAQSM  11/11/21  13:23 EST    Disclaimer: Please note that areas of this note were completed with computer voice recognition software.  Quite often unanticipated grammatical, syntax, homophones, and other interpretive errors are inadvertently transcribed by the computer software. Please excuse any errors that have escaped final proofreading.  "

## 2021-11-30 RX ORDER — MULTIPLE VITAMINS W/ MINERALS TAB 9MG-400MCG
1 TAB ORAL DAILY
COMMUNITY

## 2021-12-03 ENCOUNTER — ON CAMPUS - OUTPATIENT (OUTPATIENT)
Dept: URBAN - METROPOLITAN AREA HOSPITAL 85 | Facility: HOSPITAL | Age: 56
End: 2021-12-03
Payer: COMMERCIAL

## 2021-12-03 ENCOUNTER — ANESTHESIA (OUTPATIENT)
Dept: GASTROENTEROLOGY | Facility: HOSPITAL | Age: 56
End: 2021-12-03

## 2021-12-03 ENCOUNTER — ANESTHESIA EVENT (OUTPATIENT)
Dept: GASTROENTEROLOGY | Facility: HOSPITAL | Age: 56
End: 2021-12-03

## 2021-12-03 ENCOUNTER — HOSPITAL ENCOUNTER (OUTPATIENT)
Facility: HOSPITAL | Age: 56
Setting detail: HOSPITAL OUTPATIENT SURGERY
Discharge: HOME | End: 2021-12-03
Attending: INTERNAL MEDICINE | Admitting: INTERNAL MEDICINE

## 2021-12-03 VITALS
HEIGHT: 64 IN | WEIGHT: 293 LBS | BODY MASS INDEX: 50.02 KG/M2 | RESPIRATION RATE: 13 BRPM | HEART RATE: 62 BPM | DIASTOLIC BLOOD PRESSURE: 88 MMHG | OXYGEN SATURATION: 95 % | TEMPERATURE: 97.8 F | SYSTOLIC BLOOD PRESSURE: 152 MMHG

## 2021-12-03 DIAGNOSIS — D17.5 BENIGN LIPOMATOUS NEOPLASM OF INTRA-ABDOMINAL ORGANS: ICD-10-CM

## 2021-12-03 DIAGNOSIS — D12.3 BENIGN NEOPLASM OF TRANSVERSE COLON: ICD-10-CM

## 2021-12-03 DIAGNOSIS — K57.30 DIVERTICULOSIS OF LARGE INTESTINE WITHOUT PERFORATION OR ABS: ICD-10-CM

## 2021-12-03 DIAGNOSIS — Z12.11 COLON CANCER SCREENING: ICD-10-CM

## 2021-12-03 DIAGNOSIS — Z12.11 ENCOUNTER FOR SCREENING FOR MALIGNANT NEOPLASM OF COLON: ICD-10-CM

## 2021-12-03 LAB — GLUCOSE BLDC GLUCOMTR-MCNC: 149 MG/DL (ref 70–105)

## 2021-12-03 PROCEDURE — 82962 GLUCOSE BLOOD TEST: CPT

## 2021-12-03 PROCEDURE — 88305 TISSUE EXAM BY PATHOLOGIST: CPT | Performed by: INTERNAL MEDICINE

## 2021-12-03 PROCEDURE — 25010000002 FENTANYL CITRATE (PF) 100 MCG/2ML SOLUTION: Performed by: NURSE ANESTHETIST, CERTIFIED REGISTERED

## 2021-12-03 PROCEDURE — 25010000002 PROPOFOL 200 MG/20ML EMULSION: Performed by: NURSE ANESTHETIST, CERTIFIED REGISTERED

## 2021-12-03 PROCEDURE — 45385 COLONOSCOPY W/LESION REMOVAL: CPT | Performed by: INTERNAL MEDICINE

## 2021-12-03 PROCEDURE — 45380 COLONOSCOPY AND BIOPSY: CPT | Mod: 59 | Performed by: INTERNAL MEDICINE

## 2021-12-03 RX ORDER — SODIUM CHLORIDE 9 MG/ML
INJECTION, SOLUTION INTRAVENOUS CONTINUOUS PRN
Status: DISCONTINUED | OUTPATIENT
Start: 2021-12-03 | End: 2021-12-03 | Stop reason: SURG

## 2021-12-03 RX ORDER — LIDOCAINE HYDROCHLORIDE 20 MG/ML
INJECTION, SOLUTION EPIDURAL; INFILTRATION; INTRACAUDAL; PERINEURAL AS NEEDED
Status: DISCONTINUED | OUTPATIENT
Start: 2021-12-03 | End: 2021-12-03 | Stop reason: SURG

## 2021-12-03 RX ORDER — PROPOFOL 10 MG/ML
INJECTION, EMULSION INTRAVENOUS AS NEEDED
Status: DISCONTINUED | OUTPATIENT
Start: 2021-12-03 | End: 2021-12-03 | Stop reason: SURG

## 2021-12-03 RX ORDER — FENTANYL CITRATE 50 UG/ML
INJECTION, SOLUTION INTRAMUSCULAR; INTRAVENOUS AS NEEDED
Status: DISCONTINUED | OUTPATIENT
Start: 2021-12-03 | End: 2021-12-03 | Stop reason: SURG

## 2021-12-03 RX ADMIN — SODIUM CHLORIDE: 0.9 INJECTION, SOLUTION INTRAVENOUS at 09:56

## 2021-12-03 RX ADMIN — FENTANYL CITRATE 50 MCG: 0.05 INJECTION, SOLUTION INTRAMUSCULAR; INTRAVENOUS at 10:27

## 2021-12-03 RX ADMIN — PROPOFOL 50 MG: 10 INJECTION, EMULSION INTRAVENOUS at 10:04

## 2021-12-03 RX ADMIN — PROPOFOL 50 MG: 10 INJECTION, EMULSION INTRAVENOUS at 10:19

## 2021-12-03 RX ADMIN — LIDOCAINE HYDROCHLORIDE 100 MG: 20 INJECTION, SOLUTION EPIDURAL; INFILTRATION; INTRACAUDAL; PERINEURAL at 10:02

## 2021-12-03 RX ADMIN — PROPOFOL 50 MG: 10 INJECTION, EMULSION INTRAVENOUS at 10:12

## 2021-12-03 RX ADMIN — PROPOFOL 50 MG: 10 INJECTION, EMULSION INTRAVENOUS at 10:07

## 2021-12-03 RX ADMIN — PROPOFOL 50 MG: 10 INJECTION, EMULSION INTRAVENOUS at 10:25

## 2021-12-03 RX ADMIN — PROPOFOL 50 MG: 10 INJECTION, EMULSION INTRAVENOUS at 10:21

## 2021-12-03 RX ADMIN — PROPOFOL 50 MG: 10 INJECTION, EMULSION INTRAVENOUS at 10:27

## 2021-12-03 RX ADMIN — PROPOFOL 50 MG: 10 INJECTION, EMULSION INTRAVENOUS at 10:30

## 2021-12-03 RX ADMIN — PROPOFOL 50 MG: 10 INJECTION, EMULSION INTRAVENOUS at 10:15

## 2021-12-03 RX ADMIN — PROPOFOL 150 MG: 10 INJECTION, EMULSION INTRAVENOUS at 10:02

## 2021-12-03 RX ADMIN — PROPOFOL 50 MG: 10 INJECTION, EMULSION INTRAVENOUS at 10:32

## 2021-12-03 NOTE — H&P
GI CONSULT  NOTE:    Referring Provider:    Lily Berrios APRN  [unfilled]    Chief complaint: <principal problem not specified>    Subjective .       Pre op diagnosis  H/o colon polyps      History of present illness:      Marco Heath is a 56 y.o. female who presents today for Procedure(s):  COLONOSCOPY for the indications listed below.     The updated Patient Profile was reviewed prior to the procedure, in conjunction with the Physical Exam, including medical conditions, surgical procedures, medications, allergies, family history and social history.     Pre-operatively, I reviewed the indication(s) for the procedure, the risks of the procedure [including but not limited to: unexpected bleeding possibly requiring hospitalization and/or unplanned repeat procedures, perforation possibly requiring surgical treatment, missed lesions and complications of sedation/MAC (also explained by anesthesia staff)].     I have evaluated the patient for risks associated with the planned anesthesia and the procedure to be performed and find the patient an acceptable candidate for IV sedation.    Multiple opportunities were provided for any questions or concerns, and all questions were answered satisfactorily before any anesthesia was administered. We will proceed with the planned procedure.    Past Medical History:  Past Medical History:   Diagnosis Date   • Anxiety 10/14/2019   • Arthritis 2015   • Body mass index 60.0-69.9, adult (HCC) 12/6/2016   • Cancer ([facility]) Uterine 2010   • CTS (carpal tunnel syndrome) 1992   • Depression 11/28/2016   • Diabetes mellitus ([facility]) 2012   • Essential hypertension 12/17/2013    Overview:  2015 IMO UPDATE   • Hyperlipidemia 10/14/2019   • Hypertension 2010   • Knee pain, right 11/5/2018   • Osteoarthritis Since 2015   • Osteoarthritis of knee 6/13/2017   • Type 2 diabetes mellitus without complications ([facility]) 11/28/2016       Past Surgical History:  Past Surgical History:   Procedure  Laterality Date   • CARPAL TUNNEL RELEASE  1992   • COLONOSCOPY     • DILATATION AND CURETTAGE     • GASTRIC SLEEVE LAPAROSCOPIC     • HYSTERECTOMY         Social History:  Social History     Tobacco Use   • Smoking status: Never Smoker   • Smokeless tobacco: Never Used   Vaping Use   • Vaping Use: Never used   Substance Use Topics   • Alcohol use: No   • Drug use: No       Family History:  Family History   Problem Relation Age of Onset   • Osteoporosis Mother    • Osteoporosis Maternal Grandmother        Medications:  Medications Prior to Admission   Medication Sig Dispense Refill Last Dose   • acetaminophen (TYLENOL) 650 MG 8 hr tablet Take 650 mg by mouth.      • dapagliflozin-metformin HCl ER (Xigduo XR)  MG tablet Take 1 tablet by mouth daily. 90 tablet 1    • fluconazole (DIFLUCAN) 150 MG tablet Take 1 tablet by mouth Daily. 3 tablet 0    • FLUoxetine (PROzac) 20 MG capsule Take 1 capsule by mouth daily 90 capsule 1    • Insulin Degludec (Tresiba FlexTouch) 200 UNIT/ML solution pen-injector pen injection Inject 20 Units into the skin nightly. 9 mL 5    • losartan (COZAAR) 100 MG tablet Take 1 tablet by mouth daily. 90 tablet 1    • magnesium citrate 1.745 GM/30ML solution solution Take as Directed 296 mL 0    • meloxicam (Mobic) 15 MG tablet Take 1 tablet by mouth Daily. 90 tablet 1    • multivitamin with minerals (MULTIVITAMIN ADULT PO) Take 1 tablet by mouth Daily.      • Omega-3 Fatty Acids (FISH OIL) 1000 MG capsule capsule Take  by mouth Daily With Breakfast.   11/24/2021   • pravastatin (PRAVACHOL) 20 MG tablet Take 1 tablet by mouth daily for 90 days. 90 tablet 3    • sorbitol 70 % solution solution Take as Directed 100 mL 0    • hydrOXYzine (ATARAX) 50 MG tablet Take 0.5-1 tablets by mouth every 8 (eight) hours as needed for Itching or Anxiety. 30 tablet 0    • metFORMIN ER (GLUCOPHAGE-XR) 500 MG 24 hr tablet Take 2 tablets by mouth daily with dinner 180 tablet 1        Scheduled  "Meds:  Continuous Infusions:No current facility-administered medications for this encounter.    PRN Meds:.    ALLERGIES:  Amoxicillin and Penicillins    ROS:  The following systems were reviewed and negative;  Constitution:  No fevers, chills, no unintentional weight loss  Skin: no rash, no jaundice  Eyes:  No blurry vision, no eye pain  HENT:  No change in hearing or smell  Resp:  No dyspnea or cough  CV:  No chest pain or palpitations  :  No dysuria, hematuria  Musculoskeletal:  No leg cramps or arthralgias  Neuro:  No tremor, no numbness  Psych:  No depression or confsusion    Objective     Vital Signs:   Vitals:    11/30/21 1437   Weight: (!) 145 kg (319 lb)   Height: 162.6 cm (64\")       Physical Exam:       General Appearance:    Awake and alert, in no acute distress   Head:    Normocephalic, without obvious abnormality, atraumatic   Throat:   No oral lesions, no thrush, oral mucosa moist   Lungs:     respirations regular, even and unlabored   Skin:   No rash, no jaundice       Results Review:  Lab Results (last 24 hours)     Procedure Component Value Units Date/Time    POC Glucose Once [[id number]]  (Abnormal) Collected: 12/03/21 0850    Specimen: Blood Updated: 12/03/21 0851     Glucose 149 mg/dL      Comment: Serial Number: [id number]:  [id number]             Imaging Results (Last 24 Hours)     ** No results found for the last 24 hours. **           I reviewed the patient's labs and imaging.    ASSESSMENT AND PLAN:      Active Problems:    * No active hospital problems. *       Procedure(s):  COLONOSCOPY      I discussed the patient's findings and my recommendations with the patient.    SONDRA Miller MD  12/03/21  09:19 EST                "

## 2021-12-03 NOTE — DISCHARGE INSTRUCTIONS
A responsible adult should stay with you and you should rest quietly for the rest of the day.    Do not drink alcohol, drive, operate any heavy machinery or power tools or make any legal/important decisions for the next 24 hours.     Progress your diet as tolerated.  If you begin to experience severe pain, increased shortness of breath, racing heartbeat or a fever above 101 F, seek immediate medical attention.     Follow up with MD as instructed. Call office for results in 3 to 5 days if needed. [phone]    4 Polyps  Recall for colonoscopy 1 year given poor prep in right colon  F/U path from transverse colon biopsies for histology  increase fiber in diet

## 2021-12-03 NOTE — OP NOTE
COLONOSCOPY Procedure Report    Patient Name:  Marco Heath  YOB: 1965    Date of Surgery:  12/3/2021     Pre-Op Diagnosis:  Colon cancer screening [Z12.11]    Postop diagnosis:  1.  Colon polyps  2.  Lipoma of the colon  3.  Diverticulosis    Procedure/CPT® Codes:      Procedure(s):  COLONOSCOPY with polypectomy x 4,and biopsy of submucosal lesion    Staff:  Surgeon(s):  JASE Miller MD      Anesthesia: Monitored Anesthesia Care    Description of Procedure:  A description of the procedure as well as risks, benefits and alternative methods were explained to the patient who voiced understanding and signed the corresponding consent form. A physical exam was performed and vital signs were monitored throughout the procedure.    A rectal exam was performed which was normal. An Olympus colonoscope was placed into the rectum and proceeded under direct visualization through the colon until the cecum and appendiceal orifice were identified. Careful visualization occurred upon slow withdraw of the scope. The scope was then retroflexed and the distal rectum was visualized. The quality of the prep was fair. The procedure was not difficult and there were no immediate complications.  There was no blood loss.    Impression:  1.  4 polyps were seen in the colon.  1 polyp in the transverse colon was 6 mm in diameter and was removed with cold snare polypectomy.  3 rectal polyps between 3 and 4 mm in diameter were removed with cold snare polypectomy.  All polyps were removed in single piece and completely removed.  2.  A submucosal lesion was seen in the transverse colon, with normal-appearing mucosal pit pattern.  Multiple cold forceps biopsies were performed for histology.  3.  A medium sized submucosal lesion with yellowish hue and normal pit pattern with positive pillow sign was seen in the transverse colon consistent with a transverse colon lipoma.  4.  Moderate diverticulosis of the descending and sigmoid  colon with small size openings and no bleeding  5.  The quality of prep was fair.  There was some residual stool residue in the cecum and proximal ascending colon which was unable to be completely removed despite extensive irrigation.  Due to this small lesions may have been missed.      Recommendations:  Recall for colonoscopy 1 year given poor prep in right colon  F/U path from transverse colon biopsies for histology  Polyp prevention education  Diverticulosis education, and increase fiber in diet      SONDRA Miller MD     Date: 12/3/2021    Time: 10:37 EST

## 2021-12-03 NOTE — ANESTHESIA PREPROCEDURE EVALUATION
Anesthesia Evaluation     Patient summary reviewed and Nursing notes reviewed   no history of anesthetic complications:  NPO Solid Status: > 8 hours  NPO Liquid Status: > 8 hours           Airway   Mallampati: II  TM distance: >3 FB  Neck ROM: full  Possible difficult intubation and Large neck circumference  Dental      Pulmonary    (+) sleep apnea on CPAP,   Cardiovascular     (+) hypertension, valvular problems/murmurs MR and TI, MICHAUD, hyperlipidemia,       Neuro/Psych  (+) numbness, psychiatric history Anxiety and Depression,     GI/Hepatic/Renal/Endo    (+) morbid obesity,  diabetes mellitus,     Musculoskeletal     Abdominal    Substance History      OB/GYN          Other   arthritis,    history of cancer    ROS/Med Hx Other: Chest pain, knee pain, DDD, cellulitis, CTS    Echocardiogram Findings    Left Ventricle Left ventricular systolic function is normal. Calculated EF = 56.0%. Estimated EF was in agreement with the calculated EF. Estimated EF = 55%. Normal left ventricular cavity size noted. Left ventricular wall thickness is consistent with mild concentric hypertrophy. Left ventricular diastolic dysfunction is noted (grade I) consistent with impaired relaxation.  Right Ventricle Normal right ventricular cavity size and systolic function noted.  Left Atrium Left atrial cavity size is mildly dilated.  Right Atrium Normal right atrial size noted. The inferior vena cava is normally sized. Normal IVC inspiratory collapse of greater than 50% noted.  Aortic Valve The aortic valve is grossly normal in structure. No aortic valve regurgitation is present. No aortic valve stenosis is present.  Mitral Valve The mitral valve is grossly normal in structure. Mild mitral valve regurgitation is present. No significant mitral valve stenosis is present.  Tricuspid Valve The tricuspid valve is grossly normal.  No evidence of tricuspid valve stenosis is present. Mild tricuspid valve regurgitation is present. Estimated right  ventricular systolic pressure from tricuspid regurgitation is normal (<35 mmHg). No evidence of pulmonary hypertension is present.  Pulmonic Valve The pulmonic valve is not well visualized. There is no significant pulmonic valve stenosis present. There is trace pulmonic valve regurgitation present.  Greater Vessels No dilation of the aortic root is present.  Pericardium The pericardium is normal. There is no evidence of pericardial effusion.    PSH  CARPAL TUNNEL RELEASE GASTRIC SLEEVE LAPAROSCOPIC  HYSTERECTOMY DILATATION AND CURETTAGE  COLONOSCOPY                 Anesthesia Plan    ASA 3     MAC   (Patient identified; pre-operative vital signs, all relevant labs/studies, complete medical/surgical/anesthetic history, full medication list, full allergy list, and NPO status obtained/reviewed; physical assessment performed; anesthetic options, side effects, potential complications, risks, and benefits discussed; questions answered; written anesthesia consent obtained; patient cleared for procedure; anesthesia machine and equipment checked and functioning)    Anesthetic plan, all risks, benefits, and alternatives have been provided, discussed and informed consent has been obtained with: patient.

## 2021-12-03 NOTE — ANESTHESIA POSTPROCEDURE EVALUATION
Patient: Marco Heath    Procedure Summary     Date: 12/03/21 Room / Location: [facility] ENDOSCOPY 1 / [facility] ENDOSCOPY    Anesthesia Start: 0956 Anesthesia Stop: 1040    Procedure: COLONOSCOPY with polypectomy x 4,and biopsy of transverse lipoma (N/A ) Diagnosis:       Colon cancer screening      (Colon cancer screening [Z12.11])    Surgeons: JASE Miller MD Provider: Dhiraj Henriquez MD    Anesthesia Type: MAC ASA Status: 3          Anesthesia Type: MAC    Vitals  Vitals Value Taken Time   /88 12/03/21 1050   Temp     Pulse 64 12/03/21 1052   Resp 13 12/03/21 1050   SpO2 94 % 12/03/21 1052   Vitals shown include unvalidated device data.        Post Anesthesia Care and Evaluation    Patient location during evaluation: PACU  Patient participation: complete - patient participated  Level of consciousness: awake  Pain scale: See nurse's notes for pain score.  Pain management: adequate  Airway patency: patent  Anesthetic complications: No anesthetic complications  PONV Status: none  Cardiovascular status: acceptable  Respiratory status: acceptable  Hydration status: acceptable    Comments: Patient seen and examined postoperatively; vital signs stable; SpO2 greater than or equal to 90%; cardiopulmonary status stable; nausea/vomiting adequately controlled; pain adequately controlled; no apparent anesthesia complications; patient discharged from anesthesia care when discharge criteria were met

## 2021-12-06 LAB
LAB AP CASE REPORT: NORMAL
LAB AP DIAGNOSIS COMMENT: NORMAL
PATH REPORT.FINAL DX SPEC: NORMAL
PATH REPORT.GROSS SPEC: NORMAL

## 2022-01-11 ENCOUNTER — TRANSCRIBE ORDERS (OUTPATIENT)
Dept: ADMINISTRATIVE | Facility: HOSPITAL | Age: 57
End: 2022-01-11

## 2022-01-11 DIAGNOSIS — R22.1 NECK MASS: Primary | ICD-10-CM

## 2022-01-17 ENCOUNTER — HOSPITAL ENCOUNTER (OUTPATIENT)
Dept: CT IMAGING | Facility: HOSPITAL | Age: 57
Discharge: HOME | End: 2022-01-17
Admitting: NURSE PRACTITIONER

## 2022-01-17 DIAGNOSIS — R22.1 NECK MASS: ICD-10-CM

## 2022-01-17 PROCEDURE — 0 IOPAMIDOL PER 1 ML: Performed by: NURSE PRACTITIONER

## 2022-01-17 PROCEDURE — 70491 CT SOFT TISSUE NECK W/DYE: CPT

## 2022-01-17 RX ADMIN — IOPAMIDOL 100 ML: 755 INJECTION, SOLUTION INTRAVENOUS at 11:42

## 2022-02-11 ENCOUNTER — OFFICE VISIT (OUTPATIENT)
Dept: ORTHOPEDIC SURGERY | Facility: CLINIC | Age: 57
End: 2022-02-11

## 2022-02-11 VITALS — HEIGHT: 64 IN | RESPIRATION RATE: 18 BRPM | WEIGHT: 293 LBS | BODY MASS INDEX: 50.02 KG/M2

## 2022-02-11 DIAGNOSIS — M17.0 PRIMARY OSTEOARTHRITIS OF BOTH KNEES: Primary | ICD-10-CM

## 2022-02-11 PROCEDURE — 20610 DRAIN/INJ JOINT/BURSA W/O US: CPT | Performed by: FAMILY MEDICINE

## 2022-02-11 RX ADMIN — TRIAMCINOLONE ACETONIDE 80 MG: 40 INJECTION, SUSPENSION INTRA-ARTICULAR; INTRAMUSCULAR at 13:04

## 2022-02-11 NOTE — PROGRESS NOTES
Procedure   Large Joint Arthrocentesis  Date/Time: 2/11/2022 1:04 PM  Consent given by: patient  Site marked: site marked  Timeout: Immediately prior to procedure a time out was called to verify the correct patient, procedure, equipment, support staff and site/side marked as required   Supporting Documentation  Indications: pain   Procedure Details  Location: knee - Knee joint: bilateral knees.  Preparation: Patient was prepped and draped in the usual sterile fashion  Needle size: 25 G  Approach: anteromedial  Medications administered: 80 mg triamcinolone acetonide 40 MG/ML (2cc of 1% lidocaine without epinepherine, and 2cc of 40mg Kenalog)  Patient tolerance: patient tolerated the procedure well with no immediate complications (Blood loss negligable, pt admits to immediate decrease in pain and improved ROM with gentle ambulation post injection.)

## 2022-02-11 NOTE — PROGRESS NOTES
Primary Care Sports Medicine Office Visit Note     Patient ID: Marco Heath is a 57 y.o. female.    Chief Complaint:  Chief Complaint   Patient presents with   • Left Knee - Follow-up   • Right Knee - Follow-up     HPI:    Ms. Marco Heath is a 57 y.o. female who returns to the clinic today for follow up evaluation of bilateral osteoarthritic knee pain. She states last corticosteroid injection helped well, lasted a little over 2 months. Deep achy pain has now returned. Requests repeat bilateral injections.     Past Medical History:   Diagnosis Date   • Anxiety 10/14/2019   • Arthritis 2015   • Body mass index 60.0-69.9, adult ([facility]) 12/6/2016   • Cancer ([facility]) Uterine 2010   • CTS (carpal tunnel syndrome) 1992   • Depression 11/28/2016   • Diabetes mellitus ([facility]) 2012   • Essential hypertension 12/17/2013    Overview:  2015 IMO UPDATE   • Hyperlipidemia 10/14/2019   • Hypertension 2010   • Knee pain, right 11/5/2018   • Osteoarthritis Since 2015   • Osteoarthritis of knee 6/13/2017   • Sleep apnea    • Type 2 diabetes mellitus without complications ([facility]) 11/28/2016       Past Surgical History:   Procedure Laterality Date   • CARPAL TUNNEL RELEASE  1992   • COLONOSCOPY     • COLONOSCOPY N/A 12/3/2021    Procedure: COLONOSCOPY with polypectomy x 4,and biopsy of transverse lipoma;  Surgeon: JASE Miller MD;  Location: [facility] ENDOSCOPY;  Service: Gastroenterology;  Laterality: N/A;  post: diverticulosis, transverse lipoma, transverse polyp, rectal polyp x3   • DILATATION AND CURETTAGE     • GASTRIC SLEEVE LAPAROSCOPIC     • HYSTERECTOMY         Family History   Problem Relation Age of Onset   • Osteoporosis Mother    • Osteoporosis Maternal Grandmother      Social History     Occupational History   • Not on file   Tobacco Use   • Smoking status: Never Smoker   • Smokeless tobacco: Never Used   Vaping Use   • Vaping Use: Never used   Substance and Sexual Activity   • Alcohol use: No   • Drug use: No   • Sexual  "activity: Not Currently     Partners: Male      Review of Systems   Constitutional: Negative for activity change and fever.   Musculoskeletal: Positive for arthralgias.   Skin: Negative for color change and rash.   Neurological: Negative for weakness.     Objective:    Resp 18   Ht 162.6 cm (64\")   Wt (!) 144 kg (318 lb)   BMI 54.58 kg/m²     Physical Examination:  Physical Exam  Vitals and nursing note reviewed.   Constitutional:       General: She is not in acute distress.     Appearance: She is well-developed. She is not diaphoretic.   HENT:      Head: Normocephalic and atraumatic.   Eyes:      Conjunctiva/sclera: Conjunctivae normal.   Pulmonary:      Effort: Pulmonary effort is normal. No respiratory distress.   Musculoskeletal:      Right knee: No effusion.      Instability Tests: Medial Rosalino test negative and lateral Rosalino test negative.      Left knee: No effusion.      Instability Tests: Medial Rosalino test negative and lateral Rosalino test negative.   Skin:     General: Skin is warm.      Capillary Refill: Capillary refill takes less than 2 seconds.   Neurological:      Mental Status: She is alert.       Right Ankle Exam     Range of Motion   The patient has normal right ankle ROM.      Left Ankle Exam     Range of Motion   The patient has normal left ankle ROM.       Right Knee Exam     Muscle Strength   The patient has normal right knee strength.    Tenderness   The patient is experiencing tenderness in the lateral joint line, medial joint line and patella.    Range of Motion   Extension: normal   Flexion: normal     Tests   Rosalino:  Medial - negative Lateral - negative  Varus: negative Valgus: negative  Right knee patellar apprehension test: +patellar grind, +farzana li.    Other   Erythema: absent  Sensation: normal  Pulse: present (distal to the knee, DP and PT palpable)  Swelling: none  Effusion: no effusion present      Left Knee Exam     Muscle Strength   The patient has normal " "left knee strength.    Tenderness   The patient is experiencing tenderness in the lateral joint line, medial joint line and patella.    Range of Motion   Extension: normal   Flexion: normal     Tests   Rosalino:  Medial - negative Lateral - negative  Varus: negative Valgus: negative  Left knee patellar apprehension test: +patellar grind testing, +farzana li testing.    Other   Erythema: absent  Sensation: normal  Pulse: present (distal to the knee, DP and PT palpable)  Swelling: none  Effusion: no effusion present        Imaging and other tests:  No new imaging today.     Assessment and Plan:    1. Primary osteoarthritis of both knees    After discussion of risks and benefits, the patient elected to proceed with corticosteroid injection to each knee today.  The patient tolerated this procedure well without any complaints or problems.  I recommended continuation of conservative management as previous, RTC in 3-6 months or sooner if symptoms recur.    Armond BENJAMIN \"Chance\" Miguel DURANT DO, CAQSM  02/11/22  13:04 EST    Disclaimer: Please note that areas of this note were completed with computer voice recognition software.  Quite often unanticipated grammatical, syntax, homophones, and other interpretive errors are inadvertently transcribed by the computer software. Please excuse any errors that have escaped final proofreading.  "

## 2022-02-14 RX ORDER — TRIAMCINOLONE ACETONIDE 40 MG/ML
80 INJECTION, SUSPENSION INTRA-ARTICULAR; INTRAMUSCULAR
Status: COMPLETED | OUTPATIENT
Start: 2022-02-11 | End: 2022-02-11

## 2022-02-15 DIAGNOSIS — M17.0 PRIMARY OSTEOARTHRITIS OF KNEES, BILATERAL: ICD-10-CM

## 2022-02-16 RX ORDER — MELOXICAM 15 MG/1
15 TABLET ORAL DAILY
Qty: 90 TABLET | Refills: 1 | Status: SHIPPED | OUTPATIENT
Start: 2022-02-16 | End: 2022-08-26 | Stop reason: SDUPTHER

## 2022-05-13 ENCOUNTER — OFFICE VISIT (OUTPATIENT)
Dept: ORTHOPEDIC SURGERY | Facility: CLINIC | Age: 57
End: 2022-05-13

## 2022-05-13 VITALS
DIASTOLIC BLOOD PRESSURE: 90 MMHG | SYSTOLIC BLOOD PRESSURE: 155 MMHG | WEIGHT: 293 LBS | HEIGHT: 64 IN | HEART RATE: 64 BPM | BODY MASS INDEX: 50.02 KG/M2 | OXYGEN SATURATION: 96 %

## 2022-05-13 DIAGNOSIS — M17.0 PRIMARY OSTEOARTHRITIS OF BOTH KNEES: Primary | ICD-10-CM

## 2022-05-13 PROCEDURE — 20610 DRAIN/INJ JOINT/BURSA W/O US: CPT | Performed by: FAMILY MEDICINE

## 2022-05-13 RX ORDER — TRIAMCINOLONE ACETONIDE 40 MG/ML
80 INJECTION, SUSPENSION INTRA-ARTICULAR; INTRAMUSCULAR
Status: COMPLETED | OUTPATIENT
Start: 2022-05-13 | End: 2022-05-13

## 2022-05-13 RX ADMIN — TRIAMCINOLONE ACETONIDE 80 MG: 40 INJECTION, SUSPENSION INTRA-ARTICULAR; INTRAMUSCULAR at 16:42

## 2022-05-13 NOTE — PROGRESS NOTES
Primary Care Sports Medicine Office Visit Note     Patient ID: Marco Heath is a 57 y.o. female.    Chief Complaint:  Chief Complaint   Patient presents with   • Left Knee - Follow-up     injection   • Right Knee - Pain, Follow-up     injection     HPI:    Ms. Marco Heath is a 57 y.o. female. The patient presents to the clinic today for  follow-up evaluation of bilateral knee pain. She was previously here on 2/11/2022 and had bilateral corticosteroid injections.     The patient reports her bilateral knee pain has become more pronounce within the last 2 to 3 weeks. She states she uses a knee pillow at night. The patient states she recently went to a [employer] on 05/11/2022 and walked around most of the time. She feels like she is going to fall all the time and states she grabs on to things for stability. The patient states she would like to receive additional cortisone injections today. She has received gel injections in the past, which she notes did not help.     Past Medical History:   Diagnosis Date   • Anxiety 10/14/2019   • Arthritis 2015   • Body mass index 60.0-69.9, adult ([facility]) 12/6/2016   • Cancer ([facility]) Uterine 2010   • CTS (carpal tunnel syndrome) 1992   • Depression 11/28/2016   • Diabetes mellitus ([facility]) 2012   • Essential hypertension 12/17/2013    Overview:  2015 IMO UPDATE   • Hyperlipidemia 10/14/2019   • Hypertension 2010   • Knee pain, right 11/5/2018   • Osteoarthritis Since 2015   • Osteoarthritis of knee 6/13/2017   • Sleep apnea    • Type 2 diabetes mellitus without complications ([facility]) 11/28/2016       Past Surgical History:   Procedure Laterality Date   • CARPAL TUNNEL RELEASE  1992   • COLONOSCOPY     • COLONOSCOPY N/A 12/3/2021    Procedure: COLONOSCOPY with polypectomy x 4,and biopsy of transverse lipoma;  Surgeon: JASE Miller MD;  Location: [facility] ENDOSCOPY;  Service: Gastroenterology;  Laterality: N/A;  post: diverticulosis, transverse lipoma, transverse polyp, rectal polyp x3   •  "DILATATION AND CURETTAGE     • GASTRIC SLEEVE LAPAROSCOPIC     • HYSTERECTOMY         Family History   Problem Relation Age of Onset   • Osteoporosis Mother    • Osteoporosis Maternal Grandmother      Social History     Occupational History   • Not on file   Tobacco Use   • Smoking status: Never Smoker   • Smokeless tobacco: Never Used   Vaping Use   • Vaping Use: Never used   Substance and Sexual Activity   • Alcohol use: No   • Drug use: No   • Sexual activity: Not Currently     Partners: Male      Review of Systems   Constitutional: Negative for activity change and fever.   Musculoskeletal: Positive for arthralgias.   Skin: Negative for color change and rash.   Neurological: Negative for weakness.     Objective:    /90   Pulse 64   Ht 162.6 cm (64\")   Wt (!) 144 kg (318 lb)   SpO2 96%   BMI 54.58 kg/m²     Physical Examination:  Physical Exam  Vitals and nursing note reviewed.   Constitutional:       General: She is not in acute distress.     Appearance: She is well-developed. She is not diaphoretic.   HENT:      Head: Normocephalic and atraumatic.   Eyes:      Conjunctiva/sclera: Conjunctivae normal.   Pulmonary:      Effort: Pulmonary effort is normal. No respiratory distress.   Musculoskeletal:      Right knee: No effusion.      Instability Tests: Medial Rosalino test negative and lateral Rosalino test negative.      Left knee: No effusion.      Instability Tests: Medial Rosalino test negative and lateral Rosalino test negative.   Skin:     General: Skin is warm.      Capillary Refill: Capillary refill takes less than 2 seconds.   Neurological:      Mental Status: She is alert.       Right Ankle Exam     Range of Motion   The patient has normal right ankle ROM.      Left Ankle Exam     Range of Motion   The patient has normal left ankle ROM.       Right Knee Exam     Muscle Strength   The patient has normal right knee strength.    Tenderness   The patient is experiencing tenderness in the lateral " joint line, medial joint line and patella.    Range of Motion   Extension: normal   Flexion: normal     Tests   Rosalino:  Medial - negative Lateral - negative  Varus: negative Valgus: negative  Right knee patellar apprehension test: +patellar grind, +farzana li.    Other   Erythema: absent  Sensation: normal  Pulse: present (distal to the knee, DP and PT palpable)  Swelling: none  Effusion: no effusion present      Left Knee Exam     Muscle Strength   The patient has normal left knee strength.    Tenderness   The patient is experiencing tenderness in the lateral joint line, medial joint line and patella.    Range of Motion   Extension: normal   Flexion: normal     Tests   Rosalino:  Medial - negative Lateral - negative  Varus: negative Valgus: negative  Left knee patellar apprehension test: +patellar grind testing, +farzana li testing.    Other   Erythema: absent  Sensation: normal  Pulse: present (distal to the knee, DP and PT palpable)  Swelling: none  Effusion: no effusion present             Imaging and other tests:  No new imaging today    Assessment and Plan:    1.Primary osteoarthritis bilateral knees     - After discussion of risks and benefits, the patient elected to proceed with corticosteroid injection to the bilateral knees.  The patient tolerated this procedure well without any complaints or problems.  I recommended continuation of conservative management as previous, RTC in 3-6 months or sooner if symptoms recur.    Transcribed from ambient dictation for Armond Rivera II, DO by TY DUPONT.  05/13/22   15:20 EDT    Patient verbalized consent to the visit recording.    Disclaimer: Please note that areas of this note were completed with computer voice recognition software.  Quite often unanticipated grammatical, syntax, homophones, and other interpretive errors are inadvertently transcribed by the computer software. Please excuse any errors that have escaped final proofreading.

## 2022-05-13 NOTE — PROGRESS NOTES
Procedure   Large Joint Arthrocentesis  Date/Time: 5/13/2022 4:42 PM  Consent given by: patient  Site marked: site marked  Timeout: Immediately prior to procedure a time out was called to verify the correct patient, procedure, equipment, support staff and site/side marked as required   Supporting Documentation  Indications: pain   Procedure Details  Location: knee - Knee joint: Bilateral knees.  Preparation: Patient was prepped and draped in the usual sterile fashion  Needle size: 25 G  Approach: anteromedial  Medications administered: 80 mg triamcinolone acetonide 40 MG/ML (2cc of 1% lidocaine without epinepherine, and 2cc of 40mg Kenalog to each knee)  Patient tolerance: patient tolerated the procedure well with no immediate complications (Blood loss negligable, pt admits to immediate decrease in pain and improved ROM with gentle ambulation post injection.)

## 2022-08-19 ENCOUNTER — OFFICE VISIT (OUTPATIENT)
Dept: ORTHOPEDIC SURGERY | Facility: CLINIC | Age: 57
End: 2022-08-19

## 2022-08-19 VITALS — HEIGHT: 64 IN | WEIGHT: 293 LBS | BODY MASS INDEX: 50.02 KG/M2 | HEART RATE: 80 BPM

## 2022-08-19 DIAGNOSIS — M17.11 PRIMARY OSTEOARTHRITIS OF RIGHT KNEE: Primary | ICD-10-CM

## 2022-08-19 PROCEDURE — 20610 DRAIN/INJ JOINT/BURSA W/O US: CPT | Performed by: FAMILY MEDICINE

## 2022-08-19 RX ADMIN — TRIAMCINOLONE ACETONIDE 40 MG: 40 INJECTION, SUSPENSION INTRA-ARTICULAR; INTRAMUSCULAR at 15:03

## 2022-08-19 NOTE — PROGRESS NOTES
Primary Care Sports Medicine Office Visit Note     Patient ID: Marco Heath is a 57 y.o. female.    Chief Complaint:  Chief Complaint   Patient presents with   • Right Knee - Follow-up, Pain     Pain 10    • Left Knee - Follow-up, Pain     Pain 6      HPI:    Ms. Marco Heath is a 57 y.o. female. The patient presents to the clinic today for 3 month follow-up of bilateral knee pain.    The patient reports that her right knee pain has gotten worse. She states that the injection she received at her last visit did not provide any relief for her right knee.She notes that she is unable to sit down or get up due to the bilateral knee pain. 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 She inquires if she should aattend physical therapy because of the constant bilateral knee pain she endures when she moves. The patient reports that she has lost 2 pounds since her last visit and thinks that is attributed to the Ozempic she is currently taking. In the past, the patient had weighed closed to 400 pound and now she is down to 315 pounds. She admits that because of the COVID-19 pandemic and increased knee pain, she was not exercising. The patient expresses a great want for even just one knee arthroplasty. She notes that she is going to try a knee compression brace that she ordered. She is requesting bilateral knee steroid injections today.      Past Medical History:   Diagnosis Date   • Anxiety 10/14/2019   • Arthritis 2015   • Body mass index 60.0-69.9, adult ([facility]) 12/6/2016   • Cancer ([facility]) Uterine 2010   • CTS (carpal tunnel syndrome) 1992   • Depression 11/28/2016   • Diabetes mellitus ([facility]) 2012   • Essential hypertension 12/17/2013    Overview:  2015 IMO UPDATE   • Hyperlipidemia 10/14/2019   • Hypertension 2010   • Knee pain, right 11/5/2018   • Osteoarthritis Since 2015   • Osteoarthritis of knee 6/13/2017   • Sleep apnea    • Type 2 diabetes mellitus without  "complications ([facility]) 11/28/2016       Past Surgical History:   Procedure Laterality Date   • CARPAL TUNNEL RELEASE  1992   • COLONOSCOPY     • COLONOSCOPY N/A 12/3/2021    Procedure: COLONOSCOPY with polypectomy x 4,and biopsy of transverse lipoma;  Surgeon: JASE Miller MD;  Location: [facility] ENDOSCOPY;  Service: Gastroenterology;  Laterality: N/A;  post: diverticulosis, transverse lipoma, transverse polyp, rectal polyp x3   • DILATATION AND CURETTAGE     • GASTRIC SLEEVE LAPAROSCOPIC     • HYSTERECTOMY         Family History   Problem Relation Age of Onset   • Osteoporosis Mother    • Osteoporosis Maternal Grandmother      Social History     Occupational History   • Not on file   Tobacco Use   • Smoking status: Never Smoker   • Smokeless tobacco: Never Used   Vaping Use   • Vaping Use: Never used   Substance and Sexual Activity   • Alcohol use: No   • Drug use: No   • Sexual activity: Not Currently     Partners: Male      Review of Systems   Constitutional: Negative for activity change and fever.   Musculoskeletal: Positive for arthralgias.   Skin: Negative for color change and rash.   Neurological: Negative for weakness.     Objective:    Pulse 80   Ht 162.6 cm (64\")   Wt (!) 143 kg (316 lb)   BMI 54.24 kg/m²     Physical Examination:  Physical Exam  Vitals and nursing note reviewed.   Constitutional:       General: She is not in acute distress.     Appearance: She is well-developed. She is not diaphoretic.   HENT:      Head: Normocephalic and atraumatic.   Eyes:      Conjunctiva/sclera: Conjunctivae normal.   Pulmonary:      Effort: Pulmonary effort is normal. No respiratory distress.   Musculoskeletal:      Right knee: No effusion.      Instability Tests: Medial Rosalino test negative and lateral Rosalino test negative.   Skin:     General: Skin is warm.      Capillary Refill: Capillary refill takes less than 2 seconds.   Neurological:      Mental Status: She is alert.          Right Ankle Exam     Range " of Motion   The patient has normal right ankle ROM.      Right Knee Exam     Muscle Strength   The patient has normal right knee strength.    Tenderness   The patient is experiencing tenderness in the lateral joint line, medial joint line and patella.    Range of Motion   Extension: normal   Flexion: normal     Tests   Rosalino:  Medial - negative Lateral - negative  Varus: negative Valgus: negative  Right knee patellar apprehension test: +patellar grind, +farzana li.    Other   Erythema: absent  Sensation: normal  Pulse: present (distal to the knee, DP and PT palpable)  Swelling: none  Effusion: no effusion present             Imaging and other tests:  No new results were reviewed or obtained today.      Assessment and Plan:  1. Primary osteoarthritis of bilateral knees  After discussion of risks and benefits, the patient elected to proceed with corticosteroid injection to the more symptomatic right knee today. The patient tolerated this procedure well without any complaints or problems. I recommended continuation of conservative management as previous. She is to return to the clinic in 3-6 months or sooner if symptoms recur. The patient can always return for left knee in the future if this one becomes more symptomatic or worsens.        Transcribed from ambient dictation for Armond Rivera II,  by ANDRIA LOREDO.  08/19/22   14:18 EDT    Patient verbalized consent to the visit recording.    Disclaimer: Please note that areas of this note were completed with computer voice recognition software.  Quite often unanticipated grammatical, syntax, homophones, and other interpretive errors are inadvertently transcribed by the computer software. Please excuse any errors that have escaped final proofreading.

## 2022-08-20 RX ORDER — TRIAMCINOLONE ACETONIDE 40 MG/ML
40 INJECTION, SUSPENSION INTRA-ARTICULAR; INTRAMUSCULAR
Status: COMPLETED | OUTPATIENT
Start: 2022-08-19 | End: 2022-08-19

## 2022-08-20 NOTE — PROGRESS NOTES
Procedure   Large Joint Arthrocentesis: R knee  Date/Time: 8/19/2022 3:03 PM  Consent given by: patient  Site marked: site marked  Timeout: Immediately prior to procedure a time out was called to verify the correct patient, procedure, equipment, support staff and site/side marked as required   Supporting Documentation  Indications: pain   Procedure Details  Location: knee - R knee  Preparation: Patient was prepped and draped in the usual sterile fashion  Needle size: 25 G  Approach: anteromedial  Medications administered: 40 mg triamcinolone acetonide 40 MG/ML (2cc of 1% lidocaine without epinepherine, and 1cc of 40mg Kenalog)  Patient tolerance: patient tolerated the procedure well with no immediate complications (Blood loss negligable, pt admits to immediate decrease in pain and improved ROM with gentle ambulation post injection.)

## 2022-08-26 DIAGNOSIS — M17.0 PRIMARY OSTEOARTHRITIS OF KNEES, BILATERAL: ICD-10-CM

## 2022-08-26 RX ORDER — MELOXICAM 15 MG/1
15 TABLET ORAL DAILY
Qty: 90 TABLET | Refills: 1 | Status: CANCELLED | OUTPATIENT
Start: 2022-08-26

## 2022-08-29 DIAGNOSIS — M17.0 PRIMARY OSTEOARTHRITIS OF KNEES, BILATERAL: ICD-10-CM

## 2022-08-29 RX ORDER — MELOXICAM 15 MG/1
15 TABLET ORAL DAILY
Qty: 90 TABLET | Refills: 1 | Status: CANCELLED | OUTPATIENT
Start: 2022-08-26

## 2022-08-29 RX ORDER — MELOXICAM 15 MG/1
15 TABLET ORAL DAILY
Qty: 90 TABLET | Refills: 1 | Status: SHIPPED | OUTPATIENT
Start: 2022-08-29 | End: 2023-03-02 | Stop reason: SDUPTHER

## 2022-10-04 NOTE — PROGRESS NOTES
Sleep medicine follow-up visit    Marco OJEDA Kumar   1965  57 y.o. female   DATE OF SERVICE: 10/5/2022     Patient is requesting CPAP supply and new CPAP machine.     SLEEP TESTING HISTORY:      Pt. was dx with sleep apnea 2009 ahi 52 in 2009    The patient reports that she has had her current machine since 2009 and states she is needing a replacement machine.  She states she is having no problems states that her current pressure setting is very good and she feels very good with the machine.  She states she does need a new mask tubing and filter as well.  The compliance data reviewed and the patient is on CPAP therapy at Bipap 16/12 cm/H2O and compliance data indicates excellent compliance with 100% usage for more than 4 hours with an average usage of 8 hours 15 minutes. AHI down to 2.0  with CPAP therapy and mean CPAP pressure Bipap 16/12 cm of water.  The patient's hypersomnia also resolved with [place] Sleepiness Scale score of 12 with CPAP therapy.  The patient feels great and is benefiting from it and is compliant.       EPWORTH SLEEPINESS SCALE  Sitting and reading 3 WatchingTV 2  Sitting, inactive, in a public place 2  As a passenger in a car for 1 hour w/o a break  2  Lying down to rest in the afternoon  2  Sitting and talking to someone  1  Sitting quietly after a lunch  1  In a car, while stopped for traffic or a light  0  Total 12      Review of Systems   Constitutional: Positive for fatigue.   Eyes: Positive for redness.   Respiratory: Negative.    Cardiovascular: Negative.    Gastrointestinal: Negative.    Genitourinary: Negative.    Musculoskeletal: Negative.    Skin: Negative.    Neurological: Positive for light-headedness and headaches.   Hematological: Negative.    Psychiatric/Behavioral: Negative.      I reviewed and addressed ROS entered by MA.      The following portions of the patient's history were reviewed and updated as appropriate: allergies, current medications, past family history, past  medical history, past social history, past surgical history and problem list.      Family History   Problem Relation Age of Onset   • Osteoporosis Mother    • Osteoporosis Maternal Grandmother        Past Medical History:   Diagnosis Date   • Anxiety 10/14/2019   • Arthritis 2015   • Body mass index 60.0-69.9, adult ([facility]) 12/6/2016   • Cancer ([facility]) Uterine 2010   • CTS (carpal tunnel syndrome) 1992   • Depression 11/28/2016   • Diabetes mellitus ([facility]) 2012   • Essential hypertension 12/17/2013    Overview:  2015 IMO UPDATE   • Hyperlipidemia 10/14/2019   • Hypertension 2010   • Knee pain, right 11/5/2018   • Osteoarthritis Since 2015   • Osteoarthritis of knee 6/13/2017   • Sleep apnea    • Type 2 diabetes mellitus without complications ([facility]) 11/28/2016       Social History     Socioeconomic History   • Marital status:    Tobacco Use   • Smoking status: Never Smoker   • Smokeless tobacco: Never Used   Vaping Use   • Vaping Use: Never used   Substance and Sexual Activity   • Alcohol use: No   • Drug use: No   • Sexual activity: Not Currently     Partners: Male         Current Outpatient Medications:   •  acetaminophen (TYLENOL) 650 MG 8 hr tablet, Take 650 mg by mouth., Disp: , Rfl:   •  dapagliflozin-metformin HCl ER (Xigduo XR)  MG tablet, Take 1 tablet by mouth daily., Disp: 90 tablet, Rfl: 1  •  fluconazole (DIFLUCAN) 150 MG tablet, Take 1 tablet by mouth Daily., Disp: 3 tablet, Rfl: 0  •  fluconazole (DIFLUCAN) 150 MG tablet, Take 1 tablet by mouth Daily., Disp: 3 tablet, Rfl: 0  •  FLUoxetine (PROzac) 20 MG capsule, Take 1 capsule by mouth daily, Disp: 90 capsule, Rfl: 1  •  FLUoxetine (PROzac) 20 MG capsule, Take 1 capsule by mouth daily, Disp: 90 capsule, Rfl: 1  •  hydrOXYzine (ATARAX) 50 MG tablet, Take 0.5-1 tablets by mouth every 8 (eight) hours as needed for Itching or Anxiety., Disp: 30 tablet, Rfl: 0  •  Insulin Degludec (Tresiba FlexTouch) 200 UNIT/ML solution pen-injector pen injection,  Inject 20 Units into the skin nightly., Disp: 9 mL, Rfl: 5  •  losartan (COZAAR) 100 MG tablet, Take 1 tablet by mouth daily., Disp: 90 tablet, Rfl: 1  •  meloxicam (Mobic) 15 MG tablet, Take 1 tablet by mouth Daily., Disp: 90 tablet, Rfl: 1  •  multivitamin with minerals tablet tablet, Take 1 tablet by mouth Daily., Disp: , Rfl:   •  Omega-3 Fatty Acids (FISH OIL) 1000 MG capsule capsule, Take  by mouth Daily With Breakfast., Disp: , Rfl:   •  pravastatin (PRAVACHOL) 20 MG tablet, Take 1 tablet by mouth daily for 90 days., Disp: 90 tablet, Rfl: 3  •  Semaglutide,0.25 or 0.5MG/DOS, (Ozempic, 0.25 or 0.5 MG/DOSE,) 2 MG/1.5ML solution pen-injector, Inject 0.5 mg into the skin every 7 days., Disp: 1.5 mL, Rfl: 1    Allergies   Allergen Reactions   • Amoxicillin Hives   • Penicillins Unknown (See Comments) and Hives     Not specified on transfer form        PHYSICAL EXAMINATION:  Vitals:    10/05/22 0934   BP: 142/86   Pulse: 71   Temp: 97.3 °F (36.3 °C)      Body mass index is 53.86 kg/m².       HEENT: Normal.    CARDIAC: Normal.   LUNGS: Clear to auscultation.   EXTREMITIES: No edema.     Diagnoses and all orders for this visit:    1. Obstructive sleep apnea (Primary)       Mask, Tubing and Filters and NEW Machine       The patient was cautioned that obstructive sleep disordered breathing might be aggravated by certain conditions such as traveling to high altitudes, post anesthetic states or respiratory allergies.  Medications such as sedatives, hypnotics, muscle relaxants, opiate analgesics and or alcohol can aggravate the underlying obstructive sleep disordered breathing.  4.  If the patient is significantly drowsy or inattentive during the daytime, should be advised to avoid situations such as driving in which safety could be compromised by impairment of alertness    Return in about 6 months (around 4/5/2023) for Katharina or Dr Seipel   in 3 months SLEEP  (Post New Machine) .    I spent 23 minutes caring for Marco on  this date of service. This time includes time spent by me in the following activities: reviewing tests, obtaining and/or reviewing a separately obtained history, performing a medically appropriate examination and/or evaluation, ordering medications, tests, or procedures, referring and communicating with other health care professionals and documenting information in the medical record.      This document has been electronically signed by Katharina GONZALEZ on October 5, 2022 10:19 EDT

## 2022-10-05 ENCOUNTER — OFFICE VISIT (OUTPATIENT)
Dept: NEUROLOGY | Facility: CLINIC | Age: 57
End: 2022-10-05

## 2022-10-05 ENCOUNTER — TELEPHONE (OUTPATIENT)
Dept: NEUROLOGY | Facility: CLINIC | Age: 57
End: 2022-10-05

## 2022-10-05 VITALS
HEIGHT: 64 IN | WEIGHT: 293 LBS | SYSTOLIC BLOOD PRESSURE: 142 MMHG | BODY MASS INDEX: 50.02 KG/M2 | TEMPERATURE: 97.3 F | DIASTOLIC BLOOD PRESSURE: 86 MMHG | HEART RATE: 71 BPM

## 2022-10-05 DIAGNOSIS — G47.33 OBSTRUCTIVE SLEEP APNEA: Primary | ICD-10-CM

## 2022-10-05 PROCEDURE — 99213 OFFICE O/P EST LOW 20 MIN: CPT | Performed by: NURSE PRACTITIONER

## 2022-10-10 ENCOUNTER — TELEPHONE (OUTPATIENT)
Dept: NEUROLOGY | Facility: CLINIC | Age: 57
End: 2022-10-10

## 2022-10-20 ENCOUNTER — PATIENT OUTREACH (OUTPATIENT)
Dept: CASE MANAGEMENT | Facility: OTHER | Age: 57
End: 2022-10-20

## 2022-10-20 NOTE — OUTREACH NOTE
Adult Patient Profile  Questions/Answers    Flowsheet Row Most Recent Value   Symptoms/Conditions Managed at Home diabetes, type 2   Barriers to Managing Health none   Diabetes Management Strategies blood glucose testing, activity, diet modification, medication therapy, routine screenings, weight management   Last A1C Result 6.3 9/22   Diabetes Self-Management Outcome 4 (good)   Missed Doses of Prescribed Medications During Past Week no   Taken Prescribed Medications at Different Time or Schedule During Past Week no   Taken More or Less Medication Than Prescribed no   Barriers to Taking Medication as Prescribed none   How to be Addressed Marco   How Well Do You Speak English? very well   Source of Information patient        Adult Wellness Assessment  Questions/Answers    Flowsheet Row Most Recent Value   Help with Reading Health-Related Information never   Problems Learning about Medical Condition never   Confidence in Filling Out Forms by Self always      AMBULATORY CASE MANAGEMENT NOTE  Patient Outreach    Pt with PM of bariatric surgery 2014, DM type 2, htn. Diabetes and HTN, well controlled at this time. Patient states she is in need of bilateral knee replacement but cannot be scheduled until additional wt loss is attained. Patient is working very hard to meet goal but does have significant pain and mobility issues making exercise difficult. She has improved wt and a1c since early this year. A12c decreased from 8.3 to 6.3 and lost 20lbs with the addition of Ozempic .5mg. Patient states she does monitor diet very closely and limits portions. Over all feels health has improved and maintains other than knee pain.  No SDOH deficits noted at this time, will continue to monitor    JOSE JUAN NOBLE  Ambulatory Case Management    10/20/2022, 14:25 EDT

## 2023-02-10 ENCOUNTER — OFFICE VISIT (OUTPATIENT)
Dept: ORTHOPEDIC SURGERY | Facility: CLINIC | Age: 58
End: 2023-02-10
Payer: COMMERCIAL

## 2023-02-10 VITALS — BODY MASS INDEX: 50.02 KG/M2 | HEART RATE: 68 BPM | HEIGHT: 64 IN | OXYGEN SATURATION: 99 % | WEIGHT: 293 LBS

## 2023-02-10 DIAGNOSIS — M17.0 PRIMARY OSTEOARTHRITIS OF BOTH KNEES: Primary | ICD-10-CM

## 2023-02-10 PROCEDURE — 20610 DRAIN/INJ JOINT/BURSA W/O US: CPT | Performed by: FAMILY MEDICINE

## 2023-02-10 PROCEDURE — 99213 OFFICE O/P EST LOW 20 MIN: CPT | Performed by: FAMILY MEDICINE

## 2023-02-10 RX ORDER — LORATADINE 10 MG/1
1 TABLET ORAL DAILY PRN
COMMUNITY
Start: 2023-01-23 | End: 2024-01-23

## 2023-02-10 RX ORDER — TRIAMCINOLONE ACETONIDE 40 MG/ML
80 INJECTION, SUSPENSION INTRA-ARTICULAR; INTRAMUSCULAR
Status: COMPLETED | OUTPATIENT
Start: 2023-02-10 | End: 2023-02-10

## 2023-02-10 RX ORDER — DOXYCYCLINE 100 MG/1
100 TABLET ORAL
COMMUNITY
Start: 2023-02-03 | End: 2023-02-13

## 2023-02-10 RX ADMIN — TRIAMCINOLONE ACETONIDE 80 MG: 40 INJECTION, SUSPENSION INTRA-ARTICULAR; INTRAMUSCULAR at 16:08

## 2023-02-10 NOTE — PROGRESS NOTES
Primary Care Sports Medicine Office Visit Note     Patient ID: Marco Heath is a 58 y.o. female.    Chief Complaint:  Chief Complaint   Patient presents with   • Left Knee - Pain, Follow-up   • Right Knee - Follow-up, Pain     HPI:    Ms. Marco Heath is a 58 y.o. female. The patient presents to the clinic today for follow-up evaluation of bilateral knee pain. Her last injection of bilateral knees was 08/19/2022.    The patient reports that she has lost 25 pounds since 07/2022. She states that she is on Ozempic. She reports that she can hardly stand up now. She states that they did new x-rays. She reports that when she got in and went to turn around to sit down, her left foot slipped off the running board and she twisted it. She states that she grabbed the steering wheel and must have upper body strength because she pulled her thumb all the way back into the truck with one arm. She reports that it has been hurting. She states that it has been popping and clicking. She reports that it is constant.    Past Medical History:   Diagnosis Date   • Anxiety 10/14/2019   • Arthritis 2015   • Body mass index 60.0-69.9, adult ([facility]) 12/6/2016   • Cancer ([facility]) Uterine 2010   • CTS (carpal tunnel syndrome) 1992   • Depression 11/28/2016   • Diabetes mellitus ([facility]) 2012   • Essential hypertension 12/17/2013    Overview:  2015 IMO UPDATE   • Hyperlipidemia 10/14/2019   • Hypertension 2010   • Knee pain, right 11/5/2018   • Osteoarthritis Since 2015   • Osteoarthritis of knee 6/13/2017   • Sleep apnea    • Type 2 diabetes mellitus without complications ([facility]) 11/28/2016       Past Surgical History:   Procedure Laterality Date   • CARPAL TUNNEL RELEASE  1992   • COLONOSCOPY     • COLONOSCOPY N/A 12/3/2021    Procedure: COLONOSCOPY with polypectomy x 4,and biopsy of transverse lipoma;  Surgeon: JASE Miller MD;  Location: [facility] ENDOSCOPY;  Service: Gastroenterology;  Laterality: N/A;  post: diverticulosis, transverse lipoma,  "transverse polyp, rectal polyp x3   • DILATATION AND CURETTAGE     • GASTRIC SLEEVE LAPAROSCOPIC     • HYSTERECTOMY         Family History   Problem Relation Age of Onset   • Osteoporosis Mother    • Osteoporosis Maternal Grandmother      Social History     Occupational History   • Not on file   Tobacco Use   • Smoking status: Never   • Smokeless tobacco: Never   Vaping Use   • Vaping Use: Never used   Substance and Sexual Activity   • Alcohol use: No   • Drug use: No   • Sexual activity: Not Currently     Partners: Male      Review of Systems   Constitutional: Negative for activity change and fever.   Musculoskeletal: Positive for arthralgias.   Skin: Negative for color change and rash.   Neurological: Negative for weakness.     Objective:    Pulse 68   Ht 162.6 cm (64\")   Wt (!) 142 kg (313 lb)   SpO2 99%   BMI 53.73 kg/m²     Physical Examination:  Physical Exam  Vitals and nursing note reviewed.   Constitutional:       General: She is not in acute distress.     Appearance: She is well-developed. She is not diaphoretic.   HENT:      Head: Normocephalic and atraumatic.   Eyes:      Conjunctiva/sclera: Conjunctivae normal.   Pulmonary:      Effort: Pulmonary effort is normal. No respiratory distress.   Musculoskeletal:      Right knee: No effusion.      Instability Tests: Medial Rosalino test negative and lateral Rosalino test negative.      Left knee:      Instability Tests: Medial Rosalino test negative.   Skin:     General: Skin is warm.      Capillary Refill: Capillary refill takes less than 2 seconds.   Neurological:      Mental Status: She is alert.       Right Ankle Exam     Range of Motion   The patient has normal right ankle ROM.      Right Knee Exam     Muscle Strength   The patient has normal right knee strength.    Tenderness   The patient is experiencing tenderness in the lateral joint line, medial joint line and patella.    Range of Motion   Extension: normal   Flexion: normal     Tests "   Rosalino:  Medial - negative Lateral - negative  Varus: negative Valgus: negative  Right knee patellar apprehension test: +patellar grind, +farzana pal.    Other   Erythema: absent  Sensation: normal  Pulse: present (distal to the knee, DP and PT palpable)  Swelling: none  Effusion: no effusion present      Left Knee Exam     Tests   Rosalino:  Medial - negative   Lachman:  Anterior - negative    Posterior - negative    Comments:  New evaluation yields full range of motion from 0 to 130 degrees. Mild tenderness to palpation of the lateral joint line, positive patellar grind, positive Glendo-Pal, but otherwise varus and valgus stress tests are negative. Rosalino's test is negative. Lachman is negative.        Imaging and other tests:  Repeat three-view XR bilateral knees yields severe end-stage osteoarthritic disease of tricompartmental bilateral knees.    Assessment and Plan:    1. Primary osteoarthritis of both knees  - XR Knee 3 View Bilateral    After discussion of risks and benefits, the patient elected to proceed with corticosteroid injection to the bilateral knees. The patient tolerated this procedure well without any complaints or problems. I recommended continuation of conservative management as previous, RTC in 3-6 months or sooner if symptoms recur.      Transcribed from ambient dictation for Armond Rivera II, DO by Jamir Pena.  02/10/23   15:43 EST    Patient or patient representative verbalized consent to the visit recording.  I have personally performed the services described in this document as transcribed by the above individual, and it is both accurate and complete.    Disclaimer: Please note that areas of this note were completed with computer voice recognition software.  Quite often unanticipated grammatical, syntax, homophones, and other interpretive errors are inadvertently transcribed by the computer software. Please excuse any errors that have escaped final proofreading.

## 2023-02-10 NOTE — PROGRESS NOTES
Procedure   Large Joint Arthrocentesis  Date/Time: 2/10/2023 4:08 PM  Consent given by: patient  Site marked: site marked  Timeout: Immediately prior to procedure a time out was called to verify the correct patient, procedure, equipment, support staff and site/side marked as required   Supporting Documentation  Indications: pain   Procedure Details  Location: knee - Knee joint: Bilateral knees.  Preparation: Patient was prepped and draped in the usual sterile fashion  Needle size: 25 G  Approach: anteromedial  Medications administered: 80 mg triamcinolone acetonide 40 MG/ML (2cc of 1% lidocaine without epinepherine, and 2cc of 40mg Kenalog to each knee)  Patient tolerance: patient tolerated the procedure well with no immediate complications (Blood loss negligable, pt admits to immediate decrease in pain and improved ROM with gentle ambulation post injection.)

## 2023-02-14 ENCOUNTER — PATIENT OUTREACH (OUTPATIENT)
Dept: CASE MANAGEMENT | Facility: OTHER | Age: 58
End: 2023-02-14
Payer: COMMERCIAL

## 2023-02-14 NOTE — OUTREACH NOTE
AMBULATORY CASE MANAGEMENT NOTE    Name and Relationship of Patient/Support Person: Marco Heath - Self        Education Documentation  Follow-Up Care, taught by Mary Anne Perez, RN at 2/14/2023  1:14 PM.  Learner: Patient  Readiness: Acceptance  Method: Explanation  Response: Verbalizes Understanding    Hyperglycemia Identification and Treatment, taught by Mary Anne Perez, RN at 2/14/2023  1:14 PM.  Learner: Patient  Readiness: Acceptance  Method: Explanation  Response: Verbalizes Understanding      Patient Outreach    Call to patient for f/u. States she has been doing well. Remains on Ozempic 0.5  And states she has lost 35lbs since June. Goal is another 50lbs to have much needed knee surgeries. She does participate in [place] and bs are normally no more than 120 but after 2 steroid injections she has had some high BS. States she is not symptomatic and is monitoring to be sure they start to trend down. No other concerns at this time. Will continue to monitor    MARY ANNE NOBLE  Ambulatory Case Management    2/14/2023, 13:14 EST

## 2023-03-02 DIAGNOSIS — M17.0 PRIMARY OSTEOARTHRITIS OF KNEES, BILATERAL: ICD-10-CM

## 2023-03-02 RX ORDER — MELOXICAM 15 MG/1
15 TABLET ORAL DAILY
Qty: 90 TABLET | Refills: 1 | Status: CANCELLED | OUTPATIENT
Start: 2023-03-02

## 2023-03-03 DIAGNOSIS — M17.0 PRIMARY OSTEOARTHRITIS OF KNEES, BILATERAL: ICD-10-CM

## 2023-03-03 RX ORDER — MELOXICAM 15 MG/1
15 TABLET ORAL DAILY
Qty: 90 TABLET | Refills: 1 | Status: SHIPPED | OUTPATIENT
Start: 2023-03-03

## 2023-05-12 ENCOUNTER — OFFICE VISIT (OUTPATIENT)
Dept: ORTHOPEDIC SURGERY | Facility: CLINIC | Age: 58
End: 2023-05-12
Payer: COMMERCIAL

## 2023-05-12 VITALS — BODY MASS INDEX: 50.02 KG/M2 | WEIGHT: 293 LBS | HEART RATE: 85 BPM | HEIGHT: 64 IN

## 2023-05-12 DIAGNOSIS — M17.0 PRIMARY OSTEOARTHRITIS OF BOTH KNEES: Primary | ICD-10-CM

## 2023-05-12 RX ADMIN — METHYLPREDNISOLONE ACETATE 160 MG: 80 INJECTION, SUSPENSION INTRA-ARTICULAR; INTRALESIONAL; INTRAMUSCULAR; SOFT TISSUE at 17:56

## 2023-05-12 RX ADMIN — METHYLPREDNISOLONE ACETATE 160 MG: 80 INJECTION, SUSPENSION INTRA-ARTICULAR; INTRALESIONAL; INTRAMUSCULAR; SOFT TISSUE at 17:57

## 2023-05-12 NOTE — PROGRESS NOTES
Primary Care Sports Medicine Office Visit Note     Patient ID: Marco Heath is a 58 y.o. female.    Chief Complaint:  Chief Complaint   Patient presents with   • Right Knee - Follow-up, Pain     Pain 10    • Left Knee - Follow-up, Pain     Pain 7-8     HPI:    Ms. Marco Heath is a 58 y.o. female. The patient presents to the clinic today for a follow-up evaluation of bilateral knee pain.    The patient requests another injection, and received her last 3 months ago. Her knees became bothersome again 2 to 3 weeks ago. She is trying to do weight exercises and will start using a tricycle.    Past Medical History:   Diagnosis Date   • Anxiety 10/14/2019   • Arthritis 2015   • Body mass index 60.0-69.9, adult 12/06/2016   • Cancer Uterine 2010   • CTS (carpal tunnel syndrome) 1992   • Depression 11/28/2016   • Diabetes mellitus 2012   • Essential hypertension 12/17/2013    Overview:  2015 IMO UPDATE   • Hyperlipidemia 10/14/2019   • Hypertension 2010   • Knee pain, right 11/05/2018   • Osteoarthritis Since 2015   • Osteoarthritis of knee 06/13/2017   • Sleep apnea    • Type 2 diabetes mellitus without complications 11/28/2016       Past Surgical History:   Procedure Laterality Date   • CARPAL TUNNEL RELEASE  1992   • COLONOSCOPY     • COLONOSCOPY N/A 12/03/2021    Procedure: COLONOSCOPY with polypectomy x 4,and biopsy of transverse lipoma;  Surgeon: JASE Miller MD;  Location: [facility];  Service: Gastroenterology;  Laterality: N/A;  post: diverticulosis, transverse lipoma, transverse polyp, rectal polyp x3   • DILATATION AND CURETTAGE     • GASTRIC SLEEVE LAPAROSCOPIC     • HAND SURGERY  90’s    Right Hand Carpal Tunnel Release   • HYSTERECTOMY         Family History   Problem Relation Age of Onset   • Osteoporosis Mother    • Diabetes Mother    • Osteoporosis Maternal Grandmother    • Cancer Maternal Grandmother         Colon     Social History     Occupational History   • Not on file   Tobacco Use   •  "Smoking status: Never   • Smokeless tobacco: Never   Vaping Use   • Vaping Use: Never used   Substance and Sexual Activity   • Alcohol use: No   • Drug use: No   • Sexual activity: Not Currently     Partners: Male     Birth control/protection: Hysterectomy      Review of Systems   Constitutional: Negative for activity change and fever.   Musculoskeletal: Positive for arthralgias.   Skin: Negative for color change and rash.   Neurological: Negative for weakness.     Objective:    Pulse 85   Ht 162.6 cm (64\")   Wt (!) 142 kg (313 lb)   BMI 53.73 kg/m²     Physical Examination:  Physical Exam  Vitals and nursing note reviewed.   Constitutional:       General: She is not in acute distress.     Appearance: She is well-developed. She is not diaphoretic.   HENT:      Head: Normocephalic and atraumatic.   Eyes:      Conjunctiva/sclera: Conjunctivae normal.   Pulmonary:      Effort: Pulmonary effort is normal. No respiratory distress.   Musculoskeletal:      Right knee: No effusion.      Instability Tests: Medial Rosalino test negative and lateral Rosalino test negative.      Left knee: No effusion.      Instability Tests: Medial Rosalino test negative and lateral Rosalino test negative.   Skin:     General: Skin is warm.      Capillary Refill: Capillary refill takes less than 2 seconds.   Neurological:      Mental Status: She is alert.       Right Ankle Exam     Range of Motion   The patient has normal right ankle ROM.      Left Ankle Exam     Range of Motion   The patient has normal left ankle ROM.       Right Knee Exam     Muscle Strength   The patient has normal right knee strength.    Tenderness   The patient is experiencing tenderness in the lateral joint line, medial joint line and patella.    Range of Motion   Extension: normal   Flexion: normal     Tests   Rosalino:  Medial - negative Lateral - negative  Varus: negative Valgus: negative  Right knee patellar apprehension test: +patellar grind, +farzana " li.    Other   Erythema: absent  Sensation: normal  Pulse: present (distal to the knee, DP and PT palpable)  Swelling: none  Effusion: no effusion present      Left Knee Exam     Muscle Strength   The patient has normal left knee strength.    Tenderness   The patient is experiencing tenderness in the lateral joint line, medial joint line and patella.    Range of Motion   Extension: normal   Flexion: normal     Tests   Rosalino:  Medial - negative Lateral - negative  Varus: negative Valgus: negative  Left knee patellar apprehension test: +patellar grind testing, +farzana li testing.    Other   Erythema: absent  Sensation: normal  Pulse: present (distal to the knee, DP and PT palpable)  Swelling: none  Effusion: no effusion present        Imaging and other tests:  No new imaging today.    Assessment and Plan:    1. Primary osteoarthritis of bilateral knees    After discussion of risks and benefits, the patient elected to proceed with corticosteroid injection to the bilateral knees. The patient tolerated this procedure well without any complaints or problems. I recommended continuation of conservative management as previous, return to clinic in 3 to 6 months or sooner if symptoms recur.    Transcribed from ambient dictation for Armond Rivera II,  by Vanesa Munoz.  05/12/23   17:25 EDT    Patient or patient representative verbalized consent to the visit recording.  I have personally performed the services described in this document as transcribed by the above individual, and it is both accurate and complete.    Disclaimer: Please note that areas of this note were completed with computer voice recognition software.  Quite often unanticipated grammatical, syntax, homophones, and other interpretive errors are inadvertently transcribed by the computer software. Please excuse any errors that have escaped final proofreading.

## 2023-05-17 RX ORDER — METHYLPREDNISOLONE ACETATE 80 MG/ML
160 INJECTION, SUSPENSION INTRA-ARTICULAR; INTRALESIONAL; INTRAMUSCULAR; SOFT TISSUE
Status: COMPLETED | OUTPATIENT
Start: 2023-05-12 | End: 2023-05-12

## 2023-05-17 NOTE — PROGRESS NOTES
Procedure   Large Joint Arthrocentesis: R knee  Date/Time: 5/12/2023 5:56 PM  Consent given by: patient  Site marked: site marked  Timeout: Immediately prior to procedure a time out was called to verify the correct patient, procedure, equipment, support staff and site/side marked as required   Supporting Documentation  Indications: pain   Procedure Details  Location: knee - R knee  Preparation: Patient was prepped and draped in the usual sterile fashion  Needle size: 25 G  Approach: anteromedial  Medications administered: 160 mg methylPREDNISolone acetate 80 MG/ML (2cc of 1% lidocaine without epinepherine)  Patient tolerance: patient tolerated the procedure well with no immediate complications (Blood loss negligable, pt admits to immediate decrease in pain and improved ROM with gentle ambulation post injection.)

## 2023-05-17 NOTE — PROGRESS NOTES
Procedure   Large Joint Arthrocentesis: L knee  Date/Time: 5/12/2023 5:57 PM  Consent given by: patient  Site marked: site marked  Timeout: Immediately prior to procedure a time out was called to verify the correct patient, procedure, equipment, support staff and site/side marked as required   Supporting Documentation  Indications: pain   Procedure Details  Location: knee - L knee  Preparation: Patient was prepped and draped in the usual sterile fashion  Needle size: 25 G  Approach: anteromedial  Medications administered: 160 mg methylPREDNISolone acetate 80 MG/ML (2cc of 1% lidocaine without epinepherine)  Patient tolerance: patient tolerated the procedure well with no immediate complications (Blood loss negligable, pt admits to immediate decrease in pain and improved ROM with gentle ambulation post injection.)

## 2023-08-04 ENCOUNTER — TRANSCRIBE ORDERS (OUTPATIENT)
Dept: PHYSICAL THERAPY | Facility: CLINIC | Age: 58
End: 2023-08-04
Payer: COMMERCIAL

## 2023-08-04 DIAGNOSIS — M25.562 PAIN IN BOTH KNEES, UNSPECIFIED CHRONICITY: Primary | ICD-10-CM

## 2023-08-04 DIAGNOSIS — M25.561 PAIN IN BOTH KNEES, UNSPECIFIED CHRONICITY: Primary | ICD-10-CM

## 2023-08-18 ENCOUNTER — TREATMENT (OUTPATIENT)
Dept: PHYSICAL THERAPY | Facility: CLINIC | Age: 58
End: 2023-08-18
Payer: COMMERCIAL

## 2023-08-18 DIAGNOSIS — M25.561 PAIN IN BOTH KNEES, UNSPECIFIED CHRONICITY: Primary | ICD-10-CM

## 2023-08-18 DIAGNOSIS — M25.562 PAIN IN BOTH KNEES, UNSPECIFIED CHRONICITY: Primary | ICD-10-CM

## 2023-08-18 PROCEDURE — 97162 PT EVAL MOD COMPLEX 30 MIN: CPT | Performed by: PHYSICAL THERAPIST

## 2023-08-18 PROCEDURE — 97014 ELECTRIC STIMULATION THERAPY: CPT | Performed by: PHYSICAL THERAPIST

## 2023-08-18 PROCEDURE — 97110 THERAPEUTIC EXERCISES: CPT | Performed by: PHYSICAL THERAPIST

## 2023-08-18 NOTE — PROGRESS NOTES
Physical Therapy Initial Evaluation and Plan of Care    [address] [place] [address]    Patient: Marco Heath   : 1965  Diagnosis/ICD-10 Code:  Pain in both knees, unspecified chronicity [M25.561, M25.562]  Referring practitioner: Jim Felton MD  Date of Initial Visit: 2023  Today's Date: 2023  Patient seen for 1 sessions           Subjective Questionnaire: Oswestry: 64% disability      Subjective Evaluation    History of Present Illness  Onset date: ~2018.  Mechanism of injury: Radiograph B knees: severe, end-stage bone-on-bone osteoarthropathy of bilateral knees, no acute findings.     Previous injections of both gel and steroids since 2019: minimal benefits.    Has a pool at home.     C/o pain B knees and back of legs at times: affects stairs; walking/prolonged standing; squatting down (barely able); occasional burning in legs; sleep affect (has to find right position of knee - uses pillow); no N/T in LE's    PMH: reviewed in Epic      Subjective comment: 57 y/o female referred to PT due to B knee pain. Reports orthopedic MD will not perform TKR until patient losses 40 lbs more: Ni.  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 Quality of life: good    Pain  Current pain ratin  At best pain ratin  At worst pain rating: 10  Quality: discomfort and dull ache  Relieving factors: change in position  Aggravating factors: movement, standing, stairs, ambulation and squatting (see above)    Social Support  Lives in: multiple-level home (resides on first floor - no steps.)  Lives with: spouse and adult children ([id number])    Diagnostic Tests  X-ray: abnormal (Severe, end-stage bone-on-bone osteoarthropathy of bilateral knees, no  acute findings.)    Treatments  Previous treatment: injection treatment and medication  Patient Goals  Patient goals for therapy: increased strength, decreased pain and return to  sport/leisure activities  Patient goal: lose weight to allow for surgery     Access Code: [email or web address]  URL: [email or web address]  Date: 08/18/2023  Prepared by: Frank Boykin    Exercises  - Supine Quad Set  - 1 x daily - 7 x weekly - 2 sets - 10 reps - 5 sec hold  - Hooklying Straight Leg Raise  - 1 x daily - 7 x weekly - 2 sets - 10 reps  - Seated Long Arc Quad  - 1 x daily - 7 x weekly - 2 sets - 10 reps - 2-3 hold  - Hooklying Hamstring Stretch with Strap  - 1 x daily - 7 x weekly - 1 sets - 3 reps - 20 sec hold  - Seated Calf Stretch with Strap  - 1 x daily - 7 x weekly - 1 sets - 3 reps - 20 sec hold      Objective          Observations     Additional Knee Observation Details  No significant swelling: petechia noted lateral L LE from ankle to thigh; some redness anterolateral aspect of legs: advised to monitor and f/u with MD as needed.    Tenderness   Left Knee   Tenderness in the inferior patella, lateral joint line and medial joint line.     Right Knee   Tenderness in the inferior patella, lateral joint line and medial joint line.     Active Range of Motion   Left Knee   Flexion: 90 degrees with pain  Extension: 10 degrees with pain    Right Knee   Flexion: 100 degrees with pain  Extension: 5 degrees with pain    Additional Active Range of Motion Details  Slightly more pain L than R    Patellar Mobility   Left Knee Hypomobile in the left medial, left lateral, left superior and left inferior patellar tendon(s).     Right Knee Hypomobile in the medial, lateral, superior and inferior patellar tendon(s).     Additional Patellar Mobility Details  Mild hypomobility B patellae    Strength/Myotome Testing     Left Knee   Flexion: 4  Extension: 4  Quadriceps contraction: fair    Right Knee   Flexion: 4  Extension: 4  Quadriceps contraction: fair    Additional Strength Details  Pain with testing B knees    Ambulation   Weight-Bearing Status   Assistive device used: none    Ambulation: Level Surfaces   Ambulation  with assistive device: independent    Additional Level Surfaces Ambulation Details  Slow and guarded: lacks full knee extension with heel strike and during terminal stance phase.     Ambulation: Stairs   Ascend stairs: independent  Pattern: non-reciprocal  Railings: two rails  Descend stairs: independent  Pattern: non-reciprocal  Railings: two rails    Additional Stairs Ambulation Details  Slow and guarded     Observational Gait   Gait: antalgic and asymmetric   Decreased walking speed and stride length.         Assessment & Plan       Assessment  Impairments: abnormal gait, abnormal muscle tone, abnormal or restricted ROM, activity intolerance, impaired physical strength, lacks appropriate home exercise program, pain with function, safety issue and weight-bearing intolerance   Functional limitations: lifting, sleeping, walking, uncomfortable because of pain and standing   Assessment details: Patient presents with significant impairments, including B knee pain secondary to severe DJD in both knees; decreased mobility; impaired gait; decreased ROM and strength; failed conservative injections. Patient is a candidate for TKR, but needs to lose 40 lbs prior to surgery - has already lost ~60 lbs.    Based on the above impairments and the examination, this patient has the potential to benefit from Physical Therapy.     Will initiate principles of aquatic therapy to offload knees, thermal effects to reduce pain, and hydrostatic pressure to facilitate exercise with transition to land as tolerated. Has pool at home and can utilize for HEP and ongoing exercise.    Barriers to therapy: DM; severe end-stage OA B knees; obesity  Prognosis: good    Goals  Plan Goals: STG's (2 - 4 weeks)  1. Tolerate 45 minutes aquatic therapy with reduction in B pain.   2. Able to ambulate x 10 min on Aquatic TM with improved gait pattern and less pain than on land.  3.  Patient to report 25% improvement in pain and functional activities, gait.    4. Patient will be reporting improved sleep patterns: able to sleep > 4 hours without waking from pain.  5. Patient will be able to tolerate standing for > 15 minutes without increased pain.    LTG's (by d/c)  1. Independent with HEP and able to manage condition independently.  2. Patient will be able to perform household chores and be able to ambulate community distances with tolerable knee pain B and report a reduction subjectively of 50% in symptoms (0 to 100%).  3. Patient will demonstrate an improvement in overall function as measured by a significant improvement in the Oxford knee scale: decreased 50%.  4. Patient will be able to tolerate sitting for > 60 minutes without increased B knee pain, in order to be able to make it through their work day.               Plan  Therapy options: will be seen for skilled therapy services  Planned modality interventions: cryotherapy, electrical stimulation/Angolan stimulation, ultrasound and thermotherapy (hydrocollator packs)  Other planned modality interventions: Aquatic  Planned therapy interventions: manual therapy, neuromuscular re-education, soft tissue mobilization, strengthening, stretching, therapeutic activities, home exercise program, gait training, functional ROM exercises, flexibility, joint mobilization, body mechanics training, balance/weight-bearing training and postural training  Frequency: 2x week  Duration in visits: 12  Duration in weeks: 12  Treatment plan discussed with: patient      History # of Personal Factors and/or Comorbidities: MODERATE (1-2)  Examination of Body System(s): # of elements: MODERATE (3)  Clinical Presentation: EVOLVING  Clinical Decision Making: HIGH      Timed:         Manual Therapy:         mins  29044;     Therapeutic Exercise:     17    mins  37147;     Neuromuscular Jose Miguel:       mins  47268;    Therapeutic Activity:          mins  11246;     Gait Training:           mins  74740;     Ultrasound:          mins  68285;     Ionto                                  mins   66978  Self Care                            mins   39076  Aquatic                              mins 47835      Un-Timed:  Electrical Stimulation:   10      mins  14021 ( );  Dry Needling          mins self-pay  Traction         mins 90618  Low Eval          Mins  79820  Mod Eval     30     Mins  78014  High Eval                            Mins  84587  Re-Eval                               mins  52881        Timed Treatment:   17   mins   Total Treatment:      57  mins    PT SIGNATURE: Dominick Boykin PT, DPT   [place] License#: [id number]       Electronically signed by Dominick Boykin PT, 08/18/23, 11:29 AM EDT      Initial Certification  Certification Period:  8/18/2023 thru 11/15/2023  I certify that the therapy services are furnished while this patient is under my care.  The services outlined above are required by this patient, and will be reviewed every 90 days.       Physician Signature:__________________________________________________    PHYSICIAN: Jim Felton MD      DATE:     Please sign and return via fax to [phone].. Thank you, [facility].

## 2023-08-21 ENCOUNTER — TREATMENT (OUTPATIENT)
Dept: PHYSICAL THERAPY | Facility: CLINIC | Age: 58
End: 2023-08-21
Payer: COMMERCIAL

## 2023-08-21 DIAGNOSIS — M25.562 PAIN IN BOTH KNEES, UNSPECIFIED CHRONICITY: Primary | ICD-10-CM

## 2023-08-21 DIAGNOSIS — M25.561 PAIN IN BOTH KNEES, UNSPECIFIED CHRONICITY: Primary | ICD-10-CM

## 2023-08-21 PROCEDURE — 97113 AQUATIC THERAPY/EXERCISES: CPT | Performed by: PHYSICAL THERAPIST

## 2023-08-21 NOTE — PROGRESS NOTES
Physical Therapy Daily Note  [address] [place] [address]     Diagnosis Plan   1. Pain in both knees, unspecified chronicity            VISIT#: 2    Subjective   Marco Heath reports: no change since exam      Objective     See Exercise, Manual, and Modality Logs for complete treatment.     Patient Education: reviewed principles of aquatics    Assessment/Plan  - min to no pain in pool.  - focused on knee ROM and LE strengthening.   - Initiated principles of aquatic therapy to offload knees and joints, thermal effects to reduce pain, and hydrostatic pressure to facilitate exercise with transition to land as tolerated.    STG's (2 - 4 weeks)  1. Tolerate 45 minutes aquatic therapy with reduction in pain.   2. Able to ambulate x 10 min on Aquatic TM with improved gait pattern. MET  3. Patient will be able to ambulate household distances without increased pain.   4. Patient will be reporting improved sleep quality, able to sleep > 4 hours without waking from pain.  5. Patient will be able to tolerate standing for > 15 minutes without increased pain.    LTG's (by d/c)  1. Independent with HEP  to manage condition independently.  2. Patient is able to walk 30 min daily with pain no worse after, so they can make it through the grocery store.  3. Decrease overall pain 70% or more.  4. Patient will demonstrate an improvement in overall function as measured by a significant improvement in the Oswestry Disability Index score by > 10 points.  5. Patient will be able to tolerate sitting for > 60 minutes without increased pain, in order to be able to make it through their work day.     Progress per Plan of Care    2 patients in pool x 1 hour; treatment divided by total time.             Timed:         Manual Therapy:         mins  49661;     Therapeutic Exercise:        mins  81624;     Neuromuscular Jose Miguel:        mins  69235;    Therapeutic Activity:          mins  53877;     Gait Training:          mins  95331;      Ultrasound:          mins  65121;    Ionto                                   mins   38239  Self Care                            mins   18437  Canalith Repos                   mins  4209  Aquatic                           30    mins 34448    Un-Timed:  Electrical Stimulation:        mins  46521 ( );  Dry Needling          mins self-pay  Traction          mins 66358  Low Eval         Mins  98098  Mod Eval          Mins  65995  High Eval                            Mins  17047  Re-Eval                               mins  37402    Timed Treatment:    30  mins   Total Treatment:    30    mins    Dominick Boykin PT PT, DPT, [place] License #: [id number]

## 2023-08-30 ENCOUNTER — TREATMENT (OUTPATIENT)
Dept: PHYSICAL THERAPY | Facility: CLINIC | Age: 58
End: 2023-08-30
Payer: COMMERCIAL

## 2023-08-30 DIAGNOSIS — M25.562 PAIN IN BOTH KNEES, UNSPECIFIED CHRONICITY: Primary | ICD-10-CM

## 2023-08-30 DIAGNOSIS — M25.561 PAIN IN BOTH KNEES, UNSPECIFIED CHRONICITY: Primary | ICD-10-CM

## 2023-08-30 PROCEDURE — 97113 AQUATIC THERAPY/EXERCISES: CPT | Performed by: PHYSICAL THERAPIST

## 2023-08-30 NOTE — PROGRESS NOTES
Physical Therapy Daily Note  [address] [place] [address]     Diagnosis Plan   1. Pain in both knees, unspecified chronicity            VISIT#: 3    Subjective   Marco Kumar reports: using home pool -       Objective     See Exercise, Manual, and Modality Logs for complete treatment.     Patient Education: updated HEP    Access Code: [email or web address]  URL: [email or web address]  Date: 08/30/2023  Prepared by: Frank Boykin    Exercises  - Standing March at Pool Wall  - 1 x daily - 7 x weekly - 3 sets - 10 reps  - Heel Toe Raises at Pool Wall  - 1 x daily - 7 x weekly - 3 sets - 10 reps  - Standing Hip Abduction Adduction at Pool Wall  - 1 x daily - 7 x weekly - 3 sets - 10 reps  - Standing Hip Flexion Extension at Pool Wall  - 1 x daily - 7 x weekly - 3 sets - 10 reps  - Standing Hip Circles at Pool Wall  - 1 x daily - 7 x weekly - 3 sets - 10 reps  - cycling on noodle x 10 min    Assessment/Plan  - min to no pain in pool.- alone in pool x 1 hour  - focused on knee ROM and LE strengthening.   - updated HEP.   - Initiated principles of aquatic therapy to offload knees and joints, thermal effects to reduce pain, and hydrostatic pressure to facilitate exercise with transition to land as tolerated.    STG's (2 - 4 weeks)  1. Tolerate 45 minutes aquatic therapy with reduction in pain. MET  2. Able to ambulate x 10 min on Aquatic TM with improved gait pattern. MET  3. Patient will be able to ambulate household distances without increased pain.   4. Patient will be reporting improved sleep quality, able to sleep > 4 hours without waking from pain.  5. Patient will be able to tolerate standing for > 15 minutes without increased pain.    LTG's (by d/c)  1. Independent with HEP  to manage condition independently.  2. Patient is able to walk 30 min daily with pain no worse after, so they can make it through the grocery store.  3. Decrease overall pain 70% or more.  4. Patient will demonstrate an improvement in  overall function as measured by a significant improvement in the Oswestry Disability Index score by > 10 points.  5. Patient will be able to tolerate sitting for > 60 minutes without increased pain, in order to be able to make it through their work day.     Progress per Plan of Care                Timed:         Manual Therapy:         mins  87827;     Therapeutic Exercise:        mins  91855;     Neuromuscular Jose Miguel:        mins  67549;    Therapeutic Activity:          mins  84909;     Gait Training:          mins  80150;     Ultrasound:          mins  36487;    Ionto                                   mins   65782  Self Care                            mins   93475  Canalith Repos                   mins  4209  Aquatic                           60    mins 31964    Un-Timed:  Electrical Stimulation:        mins  31473 ( );  Dry Needling          mins self-pay  Traction          mins 83380  Low Eval         Mins  47928  Mod Eval          Mins  66919  High Eval                            Mins  79144  Re-Eval                               mins  82242    Timed Treatment:    60  mins   Total Treatment:    60    mins    Dominick Boykin PT PT, DPT, [place] License #: [id number]

## 2023-09-11 DIAGNOSIS — M17.0 PRIMARY OSTEOARTHRITIS OF KNEES, BILATERAL: ICD-10-CM

## 2023-09-11 RX ORDER — MELOXICAM 15 MG/1
15 TABLET ORAL DAILY
Qty: 90 TABLET | Refills: 1 | Status: SHIPPED | OUTPATIENT
Start: 2023-09-11

## 2023-09-18 ENCOUNTER — TREATMENT (OUTPATIENT)
Dept: PHYSICAL THERAPY | Facility: CLINIC | Age: 58
End: 2023-09-18
Payer: COMMERCIAL

## 2023-09-18 DIAGNOSIS — M25.562 PAIN IN BOTH KNEES, UNSPECIFIED CHRONICITY: Primary | ICD-10-CM

## 2023-09-18 DIAGNOSIS — M25.561 PAIN IN BOTH KNEES, UNSPECIFIED CHRONICITY: Primary | ICD-10-CM

## 2023-09-18 PROCEDURE — 97110 THERAPEUTIC EXERCISES: CPT | Performed by: PHYSICAL THERAPIST

## 2023-09-18 PROCEDURE — 97014 ELECTRIC STIMULATION THERAPY: CPT | Performed by: PHYSICAL THERAPIST

## 2023-09-18 NOTE — PROGRESS NOTES
Physical Therapy Daily Note  [address] [place] [address]     Diagnosis Plan   1. Pain in both knees, unspecified chronicity            VISIT#: 4    Subjective   Marco Heath reports: unable to use pool - too cold; left for trip to [place], [place]. Thinking about the [employer] and plans to call MD regarding L knee pain and potential overuse of NSAIDS.      Objective     See Exercise, Manual, and Modality Logs for complete treatment.     Patient Education: discussed importance of discussing use of NSAIDS with MD; effects of exercise and joints    Assessment/Plan  - advised to contact MD regarding NSAIDS; had to adjust knee angle on NuStep due to pain; used modalities to decrease pain after exercises. Discussed [employer] and effects of exercise to facilitate weight loss and knee stability.    STG's (2 - 4 weeks)  1. Tolerate 45 minutes aquatic therapy with reduction in pain. MET  2. Able to ambulate x 10 min on Aquatic TM with improved gait pattern. MET  3. Patient will be able to ambulate household distances without increased pain.   4. Patient will be reporting improved sleep quality, able to sleep > 4 hours without waking from pain.  5. Patient will be able to tolerate standing for > 15 minutes without increased pain.    LTG's (by d/c)  1. Independent with HEP  to manage condition independently.  2. Patient is able to walk 30 min daily with pain no worse after, so they can make it through the grocery store.  3. Decrease overall pain 70% or more.  4. Patient will demonstrate an improvement in overall function as measured by a significant improvement in the Oswestry Disability Index score by > 10 points.  5. Patient will be able to tolerate sitting for > 60 minutes without increased pain, in order to be able to make it through their work day.     Progress per Plan of Care                Timed:         Manual Therapy:         mins  24724;     Therapeutic Exercise:    30    mins  59557;     Neuromuscular Jose Miguel:         mins  77716;    Therapeutic Activity:          mins  61227;     Gait Training:          mins  06110;     Ultrasound:          mins  90543;    Ionto                                   mins   92886  Self Care                            mins   96356  Canalith Repos                   mins  4209  Aquatic                               mins 64365    Un-Timed:  Electrical Stimulation:   15     mins  20159 ( );  Dry Needling          mins self-pay  Traction          mins 91324  Low Eval         Mins  54176  Mod Eval          Mins  84123  High Eval                            Mins  82245  Re-Eval                               mins  62368    Timed Treatment:   30  mins   Total Treatment:     45   mins    Dominick Boykin PT PT, DPT, [place] License #: [id number]

## 2023-09-25 ENCOUNTER — TREATMENT (OUTPATIENT)
Dept: PHYSICAL THERAPY | Facility: CLINIC | Age: 58
End: 2023-09-25

## 2023-09-25 DIAGNOSIS — M25.562 PAIN IN BOTH KNEES, UNSPECIFIED CHRONICITY: Primary | ICD-10-CM

## 2023-09-25 DIAGNOSIS — M25.561 PAIN IN BOTH KNEES, UNSPECIFIED CHRONICITY: Primary | ICD-10-CM

## 2023-09-25 PROCEDURE — 97110 THERAPEUTIC EXERCISES: CPT | Performed by: PHYSICAL THERAPIST

## 2023-09-25 PROCEDURE — 97014 ELECTRIC STIMULATION THERAPY: CPT | Performed by: PHYSICAL THERAPIST

## 2023-09-25 NOTE — PROGRESS NOTES
Physical Therapy Daily Note  [address] [place] [address]     Diagnosis Plan   1. Pain in both knees, unspecified chronicity            VISIT#: 5    Subjective   Marco Kumar reports: exercise feels good on knees - seems like knees are more sore; plans to join [facility] to continue aquatic exercises.    Objective     See Exercise, Manual, and Modality Logs for complete treatment.     Patient Education: discussed importance of discussing use of NSAIDS with MD; effects of exercise and joints    Assessment/Plan  - less pain with exercise - will update HEP on next visit,  - STG's assessed with patient.     STG's (2 - 4 weeks)  1. Tolerate 45 minutes aquatic therapy with reduction in pain. MET  2. Able to ambulate x 10 min on Aquatic TM with improved gait pattern. MET  3. Patient will be able to ambulate household distances without increased pain.   4. Patient will be reporting improved sleep quality, able to sleep > 4 hours without waking from pain. MET  5. Patient will be able to tolerate standing for > 15 minutes without increased pain. MET    LTG's (by d/c)  1. Independent with HEP  to manage condition independently.  2. Patient is able to walk 30 min daily with pain no worse after, so they can make it through the grocery store.  3. Decrease overall pain 70% or more.  4. Patient will demonstrate an improvement in overall function as measured by a significant improvement in the Oswestry Disability Index score by > 10 points.  5. Patient will be able to tolerate sitting for > 60 minutes without increased pain, in order to be able to make it through their work day.     Progress per Plan of Care                Timed:         Manual Therapy:         mins  25177;     Therapeutic Exercise:    30    mins  35640;     Neuromuscular Jose Miguel:        mins  01671;    Therapeutic Activity:          mins  69434;     Gait Training:          mins  74333;     Ultrasound:          mins  41222;    Ionto                                    mins   96434  Self Care                            mins   14411  Canalith Repos                   mins  4209  Aquatic                               mins 08264    Un-Timed:  Electrical Stimulation:   15     mins  37532 ( );  Dry Needling          mins self-pay  Traction          mins 65668  Low Eval         Mins  87300  Mod Eval          Mins  97434  High Eval                            Mins  26054  Re-Eval                               mins  41076    Timed Treatment:   30  mins   Total Treatment:     45   mins    Dominick Boykin PT PT, DPT, [place] License #: [id number]

## 2023-10-02 ENCOUNTER — TELEPHONE (OUTPATIENT)
Dept: PHYSICAL THERAPY | Facility: CLINIC | Age: 58
End: 2023-10-02

## 2023-10-02 NOTE — TELEPHONE ENCOUNTER
Spoke with patient - injured toe and unable to tolerate treatment today; to f/u with MD regarding knee and, if needed, toe. Will continue PT on next visit.

## 2023-10-09 ENCOUNTER — DOCUMENTATION (OUTPATIENT)
Dept: PHYSICAL THERAPY | Facility: CLINIC | Age: 58
End: 2023-10-09

## 2023-10-09 NOTE — PROGRESS NOTES
Discharge Summary  Discharge Summary from Physical Therapy Report    Patient: Marco Heath   : 1965  Diagnosis/ICD-10 Code:  Pain in both knees, unspecified chronicity [M25.561, M25.562]  Referring practitioner: Jim Felton MD  Date of Initial Visit: 2023      Dates  PT visit: 23 - 23    Number of Visits: 5     Discharge Status of Patient: To continue at [facility] - cancelled appt's and will continue independently until she is able to obtain permission for TKR. Had recent injection that helped.    Goals: Partially Met    Discharge Plan: Continue with current home exercise program as instructed  Future need for rehabilitation activities  Patient to return to referring/providing physician    Date of Discharge 10/9/23        Dominick Boykin, PT, DPT  Physical Therapist

## 2023-10-09 NOTE — LETTER
Discharge Summary  Discharge Summary from Physical Therapy Report    Patient: Marco Heath   : 1965  Diagnosis/ICD-10 Code:  Pain in both knees, unspecified chronicity [M25.561, M25.562]  Referring practitioner: Jim Felton MD  Date of Initial Visit: 2023      Dates  PT visit: 23 - 23    Number of Visits: 5     Discharge Status of Patient: To continue at [facility] - cancelled appt's and will continue independently until she is able to obtain permission for TKR. Had recent injection that helped.    Goals: Partially Met    Discharge Plan: Continue with current home exercise program as instructed  Future need for rehabilitation activities  Patient to return to referring/providing physician    Date of Discharge 10/9/23        Dominick Boykin, PT, DPT  Physical Therapist

## 2023-10-19 ENCOUNTER — ON CAMPUS - OUTPATIENT (OUTPATIENT)
Dept: URBAN - METROPOLITAN AREA HOSPITAL 85 | Facility: HOSPITAL | Age: 58
End: 2023-10-19
Payer: COMMERCIAL

## 2023-10-19 ENCOUNTER — ANESTHESIA EVENT (OUTPATIENT)
Dept: GASTROENTEROLOGY | Facility: HOSPITAL | Age: 58
End: 2023-10-19
Payer: COMMERCIAL

## 2023-10-19 ENCOUNTER — HOSPITAL ENCOUNTER (OUTPATIENT)
Facility: HOSPITAL | Age: 58
Setting detail: HOSPITAL OUTPATIENT SURGERY
Discharge: HOME | End: 2023-10-19
Attending: INTERNAL MEDICINE | Admitting: INTERNAL MEDICINE
Payer: COMMERCIAL

## 2023-10-19 ENCOUNTER — ANESTHESIA (OUTPATIENT)
Dept: GASTROENTEROLOGY | Facility: HOSPITAL | Age: 58
End: 2023-10-19
Payer: COMMERCIAL

## 2023-10-19 VITALS
TEMPERATURE: 98.4 F | DIASTOLIC BLOOD PRESSURE: 78 MMHG | HEIGHT: 64 IN | RESPIRATION RATE: 16 BRPM | BODY MASS INDEX: 49.27 KG/M2 | SYSTOLIC BLOOD PRESSURE: 139 MMHG | OXYGEN SATURATION: 94 % | WEIGHT: 288.58 LBS | HEART RATE: 55 BPM

## 2023-10-19 DIAGNOSIS — Z86.010 PERSONAL HISTORY OF COLONIC POLYPS: ICD-10-CM

## 2023-10-19 DIAGNOSIS — K64.1 SECOND DEGREE HEMORRHOIDS: ICD-10-CM

## 2023-10-19 DIAGNOSIS — K57.30 DIVERTICULOSIS OF LARGE INTESTINE WITHOUT PERFORATION OR ABS: ICD-10-CM

## 2023-10-19 DIAGNOSIS — D12.3 BENIGN NEOPLASM OF TRANSVERSE COLON: ICD-10-CM

## 2023-10-19 LAB — GLUCOSE BLDC GLUCOMTR-MCNC: 81 MG/DL (ref 70–105)

## 2023-10-19 PROCEDURE — 82948 REAGENT STRIP/BLOOD GLUCOSE: CPT

## 2023-10-19 PROCEDURE — 88305 TISSUE EXAM BY PATHOLOGIST: CPT | Performed by: INTERNAL MEDICINE

## 2023-10-19 PROCEDURE — 25010000002 PROPOFOL 200 MG/20ML EMULSION: Performed by: NURSE ANESTHETIST, CERTIFIED REGISTERED

## 2023-10-19 PROCEDURE — 45385 COLONOSCOPY W/LESION REMOVAL: CPT | Mod: 33 | Performed by: INTERNAL MEDICINE

## 2023-10-19 RX ORDER — SODIUM CHLORIDE 9 MG/ML
INJECTION, SOLUTION INTRAVENOUS CONTINUOUS PRN
Status: DISCONTINUED | OUTPATIENT
Start: 2023-10-19 | End: 2023-10-19 | Stop reason: SURG

## 2023-10-19 RX ORDER — PROPOFOL 10 MG/ML
INJECTION, EMULSION INTRAVENOUS AS NEEDED
Status: DISCONTINUED | OUTPATIENT
Start: 2023-10-19 | End: 2023-10-19 | Stop reason: SURG

## 2023-10-19 RX ORDER — LIDOCAINE HYDROCHLORIDE 20 MG/ML
INJECTION, SOLUTION EPIDURAL; INFILTRATION; INTRACAUDAL; PERINEURAL AS NEEDED
Status: DISCONTINUED | OUTPATIENT
Start: 2023-10-19 | End: 2023-10-19 | Stop reason: SURG

## 2023-10-19 RX ADMIN — PROPOFOL 50 MG: 10 INJECTION, EMULSION INTRAVENOUS at 10:01

## 2023-10-19 RX ADMIN — SODIUM CHLORIDE: 9 INJECTION, SOLUTION INTRAVENOUS at 09:52

## 2023-10-19 RX ADMIN — PROPOFOL 50 MG: 10 INJECTION, EMULSION INTRAVENOUS at 10:14

## 2023-10-19 RX ADMIN — LIDOCAINE HYDROCHLORIDE 50 MG: 20 INJECTION, SOLUTION EPIDURAL; INFILTRATION; INTRACAUDAL; PERINEURAL at 09:56

## 2023-10-19 RX ADMIN — PROPOFOL 100 MG: 10 INJECTION, EMULSION INTRAVENOUS at 09:56

## 2023-10-19 RX ADMIN — PROPOFOL 25 MG: 10 INJECTION, EMULSION INTRAVENOUS at 10:02

## 2023-10-19 RX ADMIN — PROPOFOL 25 MG: 10 INJECTION, EMULSION INTRAVENOUS at 10:05

## 2023-10-19 RX ADMIN — PROPOFOL 50 MG: 10 INJECTION, EMULSION INTRAVENOUS at 10:03

## 2023-10-19 RX ADMIN — PROPOFOL 25 MG: 10 INJECTION, EMULSION INTRAVENOUS at 10:10

## 2023-10-19 RX ADMIN — PROPOFOL 25 MG: 10 INJECTION, EMULSION INTRAVENOUS at 10:07

## 2023-10-19 NOTE — DISCHARGE INSTRUCTIONS
A responsible adult should stay with you and you should rest quietly for the rest of the day.    Do not drink alcohol, drive, operate any heavy machinery or power tools or make any legal/important decisions for the next 24 hours.     Progress your diet as tolerated.  If you begin to experience severe pain, increased shortness of breath, racing heartbeat or a fever above 101 F, seek immediate medical attention.     Follow up with MD as instructed. Call office for results in 3 to 5 days if needed.    [phone]    Impression:  One colon polyp in the transverse colon which was benign, sessile, 4 mm in diameter and appeared overlying a submucosal lipoma, the polyp was removed in a single piece with cold snare polypectomy was completely removed.  Three submucosal nodules with yellowish hue and positive pillow sign and normal overlying mucosa consistent with benign lipoma.  Two lipomas in the transverse colon, one in the descending colon.  Moderate diverticulosis in the descending and sigmoid colon with small size openings with no bleeding  Small nonbleeding internal hemorrhoids     Recommendations:  -Recall for colonoscopy in 5 years given SSA on prior colonoscopy  -Needs 2-day prep for future colonoscopy  -Recommend fiber supplements daily and miralax if constipation

## 2023-10-19 NOTE — H&P
GI CONSULT  NOTE:    Referring Provider:    Lily Berrios APRN  [unfilled]    Chief complaint: <principal problem not specified>    Subjective .       Pre op diagnosis  Personal history of colonic polyps [Z86.010]      History of present illness:      Marco Heath is a 58 y.o. female who presents today for Procedure(s):  COLONOSCOPY for the indications listed below.     The updated Patient Profile was reviewed prior to the procedure, in conjunction with the Physical Exam, including medical conditions, surgical procedures, medications, allergies, family history and social history.     Pre-operatively, I reviewed the indication(s) for the procedure, the risks of the procedure [including but not limited to: unexpected bleeding possibly requiring hospitalization and/or unplanned repeat procedures, perforation possibly requiring surgical treatment, missed lesions and complications of sedation/MAC (also explained by anesthesia staff)].     I have evaluated the patient for risks associated with the planned anesthesia and the procedure to be performed and find the patient an acceptable candidate for IV sedation.    Multiple opportunities were provided for any questions or concerns, and all questions were answered satisfactorily before any anesthesia was administered. We will proceed with the planned procedure.    Past Medical History:  Past Medical History:   Diagnosis Date    Anxiety 10/14/2019    Arthritis 2015    Body mass index 60.0-69.9, adult 12/06/2016    Cancer Uterine 2010    CTS (carpal tunnel syndrome) 1992    Depression 11/28/2016    Diabetes mellitus 2012    Essential hypertension 12/17/2013    Overview:  2015 IMO UPDATE    Hyperlipidemia 10/14/2019    Hypertension 2010    Knee pain, right 11/05/2018    Osteoarthritis Since 2015    Osteoarthritis of knee 06/13/2017    Sleep apnea     Type 2 diabetes mellitus without complications 11/28/2016       Past Surgical History:  Past Surgical History:    Procedure Laterality Date    CARPAL TUNNEL RELEASE  1992    COLONOSCOPY      COLONOSCOPY N/A 12/03/2021    Procedure: COLONOSCOPY with polypectomy x 4,and biopsy of transverse lipoma;  Surgeon: JASE Miller MD;  Location: [facility] END[facility]COPY;  Service: Gastroenterology;  Laterality: N/A;  post: diverticulosis, transverse lipoma, transverse polyp, rectal polyp x3    DILATATION AND CURETTAGE      GASTRIC SLEEVE LAPAROSCOPIC      HAND SURGERY  90’s    Right Hand Carpal Tunnel Release    HYSTERECTOMY         Social History:  Social History     Tobacco Use    Smoking status: Never    Smokeless tobacco: Never   Vaping Use    Vaping Use: Never used   Substance Use Topics    Alcohol use: Not Currently    Drug use: No       Family History:  Family History   Problem Relation Age of Onset    Osteoporosis Mother     Diabetes Mother     Osteoporosis Maternal Grandmother     Cancer Maternal Grandmother         Colon       Medications:  Medications Prior to Admission   Medication Sig Dispense Refill Last Dose    acetaminophen (TYLENOL) 650 MG 8 hr tablet Take 1 tablet by mouth.       FLUoxetine (PROzac) 20 MG capsule Take 1 capsule by mouth daily 90 capsule 1     Insulin Degludec (Tresiba FlexTouch) 200 UNIT/ML solution pen-injector pen injection Inject 28 Units into the skin nightly. 15 mL 1     loratadine (CLARITIN) 10 MG tablet Take 1 tablet by mouth Daily As Needed.       losartan (COZAAR) 100 MG tablet Take 1 tablet by mouth daily. 90 tablet 1     meloxicam (Mobic) 15 MG tablet Take 1 tablet by mouth Daily. 90 tablet 1     montelukast (SINGULAIR) 10 MG tablet Take 1 tablet by mouth every night at bedtime. 30 tablet 3     multivitamin with minerals tablet tablet Take 1 tablet by mouth Daily.       Omega-3 Fatty Acids (FISH OIL) 1000 MG capsule capsule Take  by mouth Daily With Breakfast.       pravastatin (PRAVACHOL) 40 MG tablet Take 1 tablet by mouth daily for 90 days. 90 tablet 1     Semaglutide, 1 MG/DOSE, (Ozempic, 1  "MG/DOSE,) 4 MG/3ML solution pen-injector Inject 1 mg into the skin every 7 days. 9 mL 1     cefdinir (OMNICEF) 300 MG capsule Take 1 capsule by mouth 2 (two) times daily for 10 days. (Patient not taking: Reported on 10/6/2023) 20 capsule 0 Not Taking    dapagliflozin-metformin HCl ER (Xigduo XR)  MG tablet Take 1 tablet by mouth daily. 90 tablet 1     FLUoxetine (PROzac) 20 MG capsule Take 1 capsule by mouth daily 90 capsule 1     FLUoxetine (PROzac) 20 MG capsule Take 1 capsule by mouth daily. 90 capsule 1     losartan (COZAAR) 100 MG tablet Take 1 tablet by mouth daily. 90 tablet 1     montelukast (SINGULAIR) 10 MG tablet Take 1 tablet by mouth nightly. 30 tablet 3     pravastatin (PRAVACHOL) 20 MG tablet Take 1 tablet by mouth daily for 90 days. 90 tablet 3     Semaglutide,0.25 or 0.5MG/DOS, (Ozempic, 0.25 or 0.5 MG/DOSE,) 2 MG/3ML solution pen-injector Inject 0.5 mg under the skin into the appropriate area as directed Every 7 (Seven) Days. 9 mL 1     sorbitol 70 % solution solution Take 100 ml by mouth as directed for 1 day 100 mL 0        Scheduled Meds:  Continuous Infusions:No current facility-administered medications for this encounter.    PRN Meds:.    ALLERGIES:  Amoxicillin and Penicillins    ROS:  The following systems were reviewed and negative;  Constitution:  No fevers, chills, no unintentional weight loss  Skin: no rash, no jaundice  Eyes:  No blurry vision, no eye pain  HENT:  No change in hearing or smell  Resp:  No dyspnea or cough  CV:  No chest pain or palpitations  :  No dysuria, hematuria  Musculoskeletal:  No leg cramps or arthralgias  Neuro:  No tremor, no numbness  Psych:  No depression or confsusion    Objective     Vital Signs:   Vitals:    10/06/23 1419   Weight: 131 kg (288 lb)   Height: 162.6 cm (64\")       Physical Exam:       General Appearance:    Awake and alert, in no acute distress   Head:    Normocephalic, without obvious abnormality, atraumatic   Throat:   No oral " lesions, no thrush, oral mucosa moist   Lungs:     respirations regular, even and unlabored   Skin:   No rash, no jaundice       Results Review:  Lab Results (last 24 hours)       ** No results found for the last 24 hours. **            Imaging Results (Last 24 Hours)       ** No results found for the last 24 hours. **             I reviewed the patient's labs and imaging.    ASSESSMENT AND PLAN:      Active Problems:    * No active hospital problems. *       Procedure(s):  COLONOSCOPY      I discussed the patient's findings and my recommendations with the patient.    SONDRA Miller MD  10/19/23  09:19 EDT

## 2023-10-19 NOTE — ANESTHESIA POSTPROCEDURE EVALUATION
Patient: Marco Heath    Procedure Summary       Date: 10/19/23 [facility]    Anesthesia Start: 0952 Anesthesia Stop: 1019    Procedure: COLONOSCOPY, cold snare polypectomy x1 Diagnosis:       Personal history of colonic polyps      (Personal history of colonic polyps [Z86.010])    Surgeons: JASE Miller MD Provider: Emilio Fischer MD    Anesthesia Type: general ASA Status: 3            Anesthesia Type: general    Vitals  Vitals Value Taken Time   /78 10/19/23 1045   Temp     Pulse 55 10/19/23 1045   Resp 16 10/19/23 1045   SpO2 94 % 10/19/23 1045           Post Anesthesia Care and Evaluation    Patient location during evaluation: PACU  Patient participation: complete - patient participated  Level of consciousness: awake  Pain scale: See nurse's notes for pain score.  Pain management: adequate    Airway patency: patent  Anesthetic complications: No anesthetic complications  PONV Status: none  Cardiovascular status: acceptable  Respiratory status: acceptable and spontaneous ventilation  Hydration status: acceptable    Comments: Patient seen and examined postoperatively; vital signs stable; SpO2 greater than or equal to 90%; cardiopulmonary status stable; nausea/vomiting adequately controlled; pain adequately controlled; no apparent anesthesia complications; patient discharged from anesthesia care when discharge criteria were met

## 2023-10-19 NOTE — ANESTHESIA PREPROCEDURE EVALUATION
Anesthesia Evaluation     Patient summary reviewed and Nursing notes reviewed   NPO Solid Status: > 8 hours  NPO Liquid Status: > 8 hours           Airway   Mallampati: II  TM distance: >3 FB  Neck ROM: full  No difficulty expected  Dental - normal exam     Pulmonary    (+) ,sleep apnea  Cardiovascular     (+) hypertension, valvular problems/murmurs MR, hyperlipidemia      Neuro/Psych  (+) numbness, psychiatric history  GI/Hepatic/Renal/Endo    (+) morbid obesity, diabetes mellitus    Musculoskeletal     Abdominal    Substance History      OB/GYN          Other   arthritis,   history of cancer    ROS/Med Hx Other: · Left ventricular wall thickness is consistent with mild concentric hypertrophy.  · Estimated EF = 55%.  · Left ventricular systolic function is normal.  · Left atrial cavity size is mildly dilated.  · Left ventricular diastolic dysfunction (grade I) consistent with impaired relaxation.  · Mild mitral valve regurgitation is present  · Mild tricuspid valve regurgitation is present.                Anesthesia Plan    ASA 3     general     intravenous induction     Anesthetic plan, risks, benefits, and alternatives have been provided, discussed and informed consent has been obtained with: patient.    Plan discussed with CRNA.    CODE STATUS:

## 2023-10-19 NOTE — OP NOTE
COLONOSCOPY Procedure Report    Patient Name:  Marco Heath  YOB: 1965    Date of Surgery:  10/19/2023     Pre-Op Diagnosis:  Personal history of colonic polyps [Z86.010]       Post-Op Diagnosis Codes:     * Personal history of colonic polyps [Z86.010]    Postop diagnosis:  1.  Diverticulosis  2.  Internal hemorrhoids  3.  Colon polyp  4.  Colon lipomas    Procedure/CPT® Codes:      Procedure(s):  COLONOSCOPY, cold snare polypectomy x1    Staff:  Surgeon(s):  JASE Miller MD      Anesthesia: Monitored Anesthesia Care    Description of Procedure:  A description of the procedure as well as risks, benefits and alternative methods were explained to the patient who voiced understanding and signed the corresponding consent form. A physical exam was performed and vital signs were monitored throughout the procedure.    A rectal exam was performed which was normal. An Olympus colonoscope was placed into the rectum and proceeded under direct visualization through the colon until the cecum and appendiceal orifice were identified. Careful visualization occurred upon slow withdraw of the scope. The scope was then retroflexed and the distal rectum was visualized. The quality of the prep was good. The procedure was not difficult and there were no immediate complications.  There was no blood loss.    Impression:  One colon polyp in the transverse colon which was benign, sessile, 4 mm in diameter and appeared overlying a submucosal lipoma, the polyp was removed in a single piece with cold snare polypectomy was completely removed.  Three submucosal nodules with yellowish hue and positive pillow sign and normal overlying mucosa consistent with benign lipoma.  Two lipomas in the transverse colon, one in the descending colon.  Moderate diverticulosis in the descending and sigmoid colon with small size openings with no bleeding  Small nonbleeding internal hemorrhoids    Recommendations:  -Recall for colonoscopy in 5  years given SSA on prior colonoscopy  -Needs 2-day prep for future colonoscopy  -Recommend fiber supplements daily and miralax if constipation      SONDRA Miller MD     Date: 10/19/2023    Time: 10:17 EDT

## 2023-10-20 LAB
LAB AP CASE REPORT: NORMAL
PATH REPORT.FINAL DX SPEC: NORMAL
PATH REPORT.GROSS SPEC: NORMAL

## 2024-01-26 ENCOUNTER — PATIENT OUTREACH (OUTPATIENT)
Dept: CASE MANAGEMENT | Facility: OTHER | Age: 59
End: 2024-01-26
Payer: COMMERCIAL

## 2024-01-26 NOTE — OUTREACH NOTE
Pt graduated at this time. Pt DM remains managed with A1C less than 7. Continues to work on Wt loss to have total knee surgeries completed. She is knowledgeable in diet and medication and what she needs to do to maintain and manage DM and health.

## 2024-02-02 NOTE — PROGRESS NOTES
Sleep medicine follow-up visit    Marco OJEDA Kumar   1965  59 y.o. female   DATE OF SERVICE: 2/5/2024     Patient is requesting new Bipap  Current machine is broken and is over 15 years of age.  It is making high screeching noises.  The patient is currently with [facility].    SLEEP TESTING HISTORY:    Pt. was dx with sleep apnea 2009 ahi 52 in 2009     The compliance data reviewed and the patient is on Bipap therapy at 16/12 cm/H2O and compliance data indicates excellent compliance with 99.4% usage for more than 4 hours with an average usage of 8 hours 36 minutes. AHI down to 1.6 with Bipap therapy and mean Bipap pressure 16/12 cm of water.  The patient's hypersomnia also resolved with [place] Sleepiness Scale score of 8 with CPAP therapy.  The patient feels great and is benefiting from it and is compliant.       [place]TH SLEEPINESS SCALE  Sitting and reading 3 WatchingTV 2  Sitting, inactive, in a public place 0  As a passenger in a car for 1 hour w/o a break  0  Lying down to rest in the afternoon  3  Sitting and talking to someone  0  Sitting quietly after a lunch  0  In a car, while stopped for traffic or a light  0  Total 8      Review of Systems   Constitutional:  Positive for fatigue.   Eyes:  Positive for redness.   Musculoskeletal:  Positive for back pain.   All other systems reviewed and are negative.    I reviewed and addressed ROS entered by MA.      The following portions of the patient's history were reviewed and updated as appropriate: allergies, current medications, past family history, past medical history, past social history, past surgical history and problem list.      Family History   Problem Relation Age of Onset    Osteoporosis Mother     Diabetes Mother     Osteoporosis Maternal Grandmother     Cancer Maternal Grandmother         Colon       Past Medical History:   Diagnosis Date    Anxiety 10/14/2019    Arthritis 2015    Body mass index 60.0-69.9, adult 12/06/2016    Cancer Uterine  2010    CTS (carpal tunnel syndrome) 1992    Depression 11/28/2016    Diabetes mellitus 2012    Essential hypertension 12/17/2013    Overview:  2015 IMO UPDATE    Hyperlipidemia 10/14/2019    Hypertension 2010    Knee pain, right 11/05/2018    Osteoarthritis Since 2015    Osteoarthritis of knee 06/13/2017    Sleep apnea     Type 2 diabetes mellitus without complications 11/28/2016       Social History     Socioeconomic History    Marital status:    Tobacco Use    Smoking status: Never    Smokeless tobacco: Never   Vaping Use    Vaping Use: Never used   Substance and Sexual Activity    Alcohol use: Not Currently    Drug use: No    Sexual activity: Not Currently     Partners: Male     Birth control/protection: Hysterectomy         Current Outpatient Medications:     acetaminophen (TYLENOL) 650 MG 8 hr tablet, Take 1 tablet by mouth., Disp: , Rfl:     dapagliflozin-metformin HCl ER (Xigduo XR)  MG tablet, Take 1 tablet by mouth daily., Disp: 90 tablet, Rfl: 1    FLUoxetine (PROzac) 20 MG capsule, Take 1 capsule by mouth daily, Disp: 90 capsule, Rfl: 1    Insulin Degludec (Tresiba FlexTouch) 200 UNIT/ML solution pen-injector pen injection, Inject 28 Units under the skin into the appropriate area as directed Every Night., Disp: 15 mL, Rfl: 1    losartan (COZAAR) 100 MG tablet, Take 1 tablet by mouth daily., Disp: 90 tablet, Rfl: 1    meloxicam (Mobic) 15 MG tablet, Take 1 tablet by mouth Daily., Disp: 90 tablet, Rfl: 1    montelukast (SINGULAIR) 10 MG tablet, Take 1 tablet by mouth nightly., Disp: 90 tablet, Rfl: 1    multivitamin with minerals tablet tablet, Take 1 tablet by mouth Daily., Disp: , Rfl:     Omega-3 Fatty Acids (FISH OIL) 1000 MG capsule capsule, Take  by mouth Daily With Breakfast., Disp: , Rfl:     pravastatin (PRAVACHOL) 40 MG tablet, Take 1 tablet by mouth daily for 90 days., Disp: 90 tablet, Rfl: 1    Semaglutide, 1 MG/DOSE, (Ozempic, 1 MG/DOSE,) 4 MG/3ML solution pen-injector, Inject 1  mg into the skin every 7 days., Disp: 9 mL, Rfl: 1    Allergies   Allergen Reactions    Amoxicillin Hives    Penicillins Unknown (See Comments) and Hives     Not specified on transfer form        PHYSICAL EXAMINATION:  There were no vitals filed for this visit.   Body mass index is 48.75 kg/m².       HEENT: Normal.    CARDIAC: Normal.   LUNGS: Clear to auscultation.   EXTREMITIES: No edema.     Diagnoses and all orders for this visit:    1. Obstructive sleep apnea (Primary)     Ordered a NEW BIPAP, patient to use machine at least 4 to 6 hours each and every night.  Debbie will procure old test and send order to [facility].      Return for Mandatory 30 day follow up Sleep .    I spent 23 minutes caring for Marco on this date of service. This time includes time spent by me in the following activities: obtaining and/or reviewing a separately obtained history, performing a medically appropriate examination and/or evaluation, counseling and educating the patient/family/caregiver, ordering medications, tests, or procedures, documenting information in the medical record, and independently interpreting results and communicating that information with the patient/family/caregiver.      This document has been electronically signed by Katharina GONZALEZ on February 5, 2024 16:34 EST

## 2024-02-05 ENCOUNTER — OFFICE VISIT (OUTPATIENT)
Dept: NEUROLOGY | Facility: CLINIC | Age: 59
End: 2024-02-05
Payer: COMMERCIAL

## 2024-02-05 VITALS — HEIGHT: 64 IN | WEIGHT: 284 LBS | BODY MASS INDEX: 48.49 KG/M2

## 2024-02-05 DIAGNOSIS — G47.33 OBSTRUCTIVE SLEEP APNEA: Primary | ICD-10-CM

## 2024-02-05 PROCEDURE — 99213 OFFICE O/P EST LOW 20 MIN: CPT | Performed by: NURSE PRACTITIONER

## 2024-02-06 ENCOUNTER — TELEPHONE (OUTPATIENT)
Dept: NEUROLOGY | Facility: CLINIC | Age: 59
End: 2024-02-06
Payer: COMMERCIAL

## 2024-02-06 DIAGNOSIS — G47.33 OBSTRUCTIVE SLEEP APNEA: Primary | ICD-10-CM

## 2024-03-12 DIAGNOSIS — M17.0 PRIMARY OSTEOARTHRITIS OF KNEES, BILATERAL: ICD-10-CM

## 2024-03-12 RX ORDER — MELOXICAM 15 MG/1
15 TABLET ORAL DAILY
Qty: 90 TABLET | Refills: 1 | Status: SHIPPED | OUTPATIENT
Start: 2024-03-12

## 2024-03-25 NOTE — PROGRESS NOTES
"Chief Complaint  JIMMY    Subjective          Marco Heath presents to [facility] NEUROLOGY  History of Present Illness    Marco Heath is a 59-year-old seen in follow-up for her 31 to 90-day BiPAP compliance visit.  Was diagnosed with severe sleep apnea with an AHI of 52 back in 2009 and has been on BiPAP since. Prior to PAP therapy,  she had daytime somnolence which resolved with treatment.  Patient currently wears a fullface mask and received device and accessories through [employer].  When she first got her most recent mask she had some skin irritation but that quickly resolved.  She has some leaking intermittently but she attributes this to taking it off in the middle night to readjust and not due to discomfort.  Patient feels benefit from PAP therapy and will not sleep without the device.    Sleep testing history:    dx with sleep apnea 2009 ahi 52 in 2009 .    PAP download:  The patient is on BiPAP therapy set at IPAP 16 cm/H2O and EPAP 12 cmH20.   Data indicates Excellent compliance. With 100% usage for more than 4 hours with an average usage of 8 hours 27 minutes. AHI down to 0.6 .  Average pressures [id number] (set).  Average large leak (95% 14.9) and median 5.4 LPM.     The patient's hypersomnia has resolved       [place] Sleepiness Scale:  Sitting and reading 3 WatchingTV 1  Sitting, inactive, in a public place 0  As a passenger in a car for 1 hour w/o a break  1  Lying down to rest in the afternoon  2  Sitting and talking to someone  0  Sitting quietly after a lunch  1  In a car, while stopped for traffic or a light  0  Total 8    Review of Systems   Constitutional:  Positive for fatigue.   All other systems reviewed and are negative.       Objective   Vital Signs:   /83   Pulse 69   Ht 162.6 cm (64\")   Wt 129 kg (284 lb)   BMI 48.75 kg/m²     Physical Exam  Constitutional:       Appearance: Normal appearance. She is well-developed. She is obese.   HENT:      Head: " Normocephalic and atraumatic.      Jaw: No tenderness.      Mouth/Throat:      Comments: MMP 4  Eyes:      Extraocular Movements: Extraocular movements intact.      Pupils: Pupils are equal, round, and reactive to light.   Neck:      Vascular: No carotid bruit.   Cardiovascular:      Rate and Rhythm: Normal rate.      Heart sounds: No murmur heard.  Pulmonary:      Effort: Pulmonary effort is normal.   Musculoskeletal:      Cervical back: Normal range of motion and neck supple.   Skin:     General: Skin is warm and dry.      Findings: No rash.   Neurological:      Mental Status: She is alert and oriented to person, place, and time.      Cranial Nerves: No cranial nerve deficit.      Sensory: No sensory deficit.      Motor: Motor function is intact. No weakness, tremor, atrophy or abnormal muscle tone.      Gait: Gait is intact.      Deep Tendon Reflexes: Reflexes are normal and symmetric.      Reflex Scores:       Tricep reflexes are 2+ on the right side and 2+ on the left side.       Bicep reflexes are 2+ on the right side and 2+ on the left side.       Brachioradialis reflexes are 2+ on the right side and 2+ on the left side.       Patellar reflexes are 2+ on the right side and 2+ on the left side.       Achilles reflexes are 2+ on the right side and 2+ on the left side.     Comments: Strength 5+ throughout    Psychiatric:         Attention and Perception: Attention normal.         Mood and Affect: Mood normal.         Speech: Speech normal.         Behavior: Behavior normal.         Cognition and Memory: Cognition and memory normal.         Judgment: Judgment normal.        Result Review :                 Assessment and Plan    Diagnoses and all orders for this visit:    1. JIMMY treated with BiPAP (Primary)    2. Class 3 severe obesity without serious comorbidity with body mass index (BMI) of 45.0 to 49.9 in adult, unspecified obesity type          Marco Heath is seen today in follow-up for 31 to 90-day compliance  visit for BiPAP therapy. She received a replacement device due to her Mary Ann device being old and making noise.  She received her device and accessories through [employer].  The patient is compliant,  benefiting from, and tolerating PAP therapy.  Will continue to wear PAP therapy every night over 4 hours.      We discussed that weight loss may lead to an improvement in her JIMMY.  She is currently working on losing an additional 30 pounds so she can undergo knee replacement surgery.  Knee pain limits her physical activity but she is looking forward to her pool opening so she can start low-resistance exercise again.     Patient will follow-up in sleep clinic in 1 year or sooner if needed but she is encouraged to contact the office in the meantime with questions or concerns.      Follow Up   Return in about 1 year (around 3/29/2025).    Patient was given instructions and counseling regarding her condition or for health maintenance advice. Please see specific information pulled into the AVS if appropriate.       This document has been electronically signed by GARRICK Eldridge on March 29, 2024 13:11 EDT

## 2024-03-29 ENCOUNTER — OFFICE VISIT (OUTPATIENT)
Dept: NEUROLOGY | Facility: CLINIC | Age: 59
End: 2024-03-29
Payer: COMMERCIAL

## 2024-03-29 VITALS
BODY MASS INDEX: 48.49 KG/M2 | DIASTOLIC BLOOD PRESSURE: 83 MMHG | HEART RATE: 69 BPM | WEIGHT: 284 LBS | HEIGHT: 64 IN | SYSTOLIC BLOOD PRESSURE: 139 MMHG

## 2024-03-29 DIAGNOSIS — G47.33 OSA TREATED WITH BIPAP: Primary | ICD-10-CM

## 2024-03-29 DIAGNOSIS — E66.01 CLASS 3 SEVERE OBESITY WITHOUT SERIOUS COMORBIDITY WITH BODY MASS INDEX (BMI) OF 45.0 TO 49.9 IN ADULT, UNSPECIFIED OBESITY TYPE: ICD-10-CM

## 2024-03-29 NOTE — LETTER
March 29, 2024       No Recipients    Patient: Marco Heath   YOB: 1965   Date of Visit: 3/29/2024     Dear [facility] CARE:       Thank you for referring Marco Heath to me for evaluation. Below are the relevant portions of my assessment and plan of care.    If you have questions, please do not hesitate to call me. I look forward to following Marco along with you.         Sincerely,        GARRICK Farah        CC:   No Recipients    Emilee Alford APRN  03/29/24 1322  Sign when Signing Visit  Chief Complaint  JIMMY    Subjective         Marco Heath presents to [facility] NEUROLOGY  History of Present Illness    Marco Heath is a 59-year-old seen in follow-up for her 31 to 90-day BiPAP compliance visit.  Was diagnosed with severe sleep apnea with an AHI of 52 back in 2009 and has been on BiPAP since. Prior to PAP therapy,  she had daytime somnolence which resolved with treatment.  Patient currently wears a fullface mask and received device and accessories through [facility].  When she first got her most recent mask she had some skin irritation but that quickly resolved.  She has some leaking intermittently but she attributes this to taking it off in the middle night to readjust and not due to discomfort.  Patient feels benefit from PAP therapy and will not sleep without the device.    Sleep testing history:    dx with sleep apnea 2009 ahi 52 in 2009 .    PAP download:  The patient is on BiPAP therapy set at IPAP 16 cm/H2O and EPAP 12 cmH20.   Data indicates Excellent compliance. With 100% usage for more than 4 hours with an average usage of 8 hours 27 minutes. AHI down to 0.6 .  Average pressures [id number] (set).  Average large leak (95% 14.9) and median 5.4 LPM.     The patient's hypersomnia has resolved       [place] Sleepiness Scale:  Sitting and reading 3 WatchingTV 1  Sitting, inactive, in a public place 0  As a passenger in a car for 1 hour w/o a  "break  1  Lying down to rest in the afternoon  2  Sitting and talking to someone  0  Sitting quietly after a lunch  1  In a car, while stopped for traffic or a light  0  Total 8    Review of Systems   Constitutional:  Positive for fatigue.   All other systems reviewed and are negative.       Objective  Vital Signs:   /83   Pulse 69   Ht 162.6 cm (64\")   Wt 129 kg (284 lb)   BMI 48.75 kg/m²     Physical Exam  Constitutional:       Appearance: Normal appearance. She is well-developed. She is obese.   HENT:      Head: Normocephalic and atraumatic.      Jaw: No tenderness.      Mouth/Throat:      Comments: MMP 4  Eyes:      Extraocular Movements: Extraocular movements intact.      Pupils: Pupils are equal, round, and reactive to light.   Neck:      Vascular: No carotid bruit.   Cardiovascular:      Rate and Rhythm: Normal rate.      Heart sounds: No murmur heard.  Pulmonary:      Effort: Pulmonary effort is normal.   Musculoskeletal:      Cervical back: Normal range of motion and neck supple.   Skin:     General: Skin is warm and dry.      Findings: No rash.   Neurological:      Mental Status: She is alert and oriented to person, place, and time.      Cranial Nerves: No cranial nerve deficit.      Sensory: No sensory deficit.      Motor: Motor function is intact. No weakness, tremor, atrophy or abnormal muscle tone.      Gait: Gait is intact.      Deep Tendon Reflexes: Reflexes are normal and symmetric.      Reflex Scores:       Tricep reflexes are 2+ on the right side and 2+ on the left side.       Bicep reflexes are 2+ on the right side and 2+ on the left side.       Brachioradialis reflexes are 2+ on the right side and 2+ on the left side.       Patellar reflexes are 2+ on the right side and 2+ on the left side.       Achilles reflexes are 2+ on the right side and 2+ on the left side.     Comments: Strength 5+ throughout    Psychiatric:         Attention and Perception: Attention normal.         Mood and " Affect: Mood normal.         Speech: Speech normal.         Behavior: Behavior normal.         Cognition and Memory: Cognition and memory normal.         Judgment: Judgment normal.        Result Review:                 Assessment and Plan    Diagnoses and all orders for this visit:    1. JIMMY treated with BiPAP (Primary)    2. Class 3 severe obesity without serious comorbidity with body mass index (BMI) of 45.0 to 49.9 in adult, unspecified obesity type          Marco Heath is seen today in follow-up for 31 to 90-day compliance visit for BiPAP therapy. She received a replacement device due to her Mary Ann device being old and making noise.  She received her device and accessories through [employer].  The patient is compliant,  benefiting from, and tolerating PAP therapy.  Will continue to wear PAP therapy every night over 4 hours.      We discussed that weight loss may lead to an improvement in her JIMMY.  She is currently working on losing an additional 30 pounds so she can undergo knee replacement surgery.  Knee pain limits her physical activity but she is looking forward to her pool opening so she can start low-resistance exercise again.     Patient will follow-up in sleep clinic in 1 year or sooner if needed but she is encouraged to contact the office in the meantime with questions or concerns.      Follow Up   Return in about 1 year (around 3/29/2025).    Patient was given instructions and counseling regarding her condition or for health maintenance advice. Please see specific information pulled into the AVS if appropriate.       This document has been electronically signed by GARRICK Eldridge on March 29, 2024 13:11 EDT

## 2024-09-13 DIAGNOSIS — M17.0 PRIMARY OSTEOARTHRITIS OF KNEES, BILATERAL: ICD-10-CM

## 2024-09-13 RX ORDER — MELOXICAM 15 MG/1
15 TABLET ORAL DAILY
Qty: 90 TABLET | Refills: 1 | Status: SHIPPED | OUTPATIENT
Start: 2024-09-13

## 2025-03-22 DIAGNOSIS — M17.0 PRIMARY OSTEOARTHRITIS OF KNEES, BILATERAL: ICD-10-CM

## 2025-03-24 RX ORDER — MELOXICAM 15 MG/1
15 TABLET ORAL DAILY
Qty: 90 TABLET | Refills: 1 | Status: SHIPPED | OUTPATIENT
Start: 2025-03-24

## 2025-03-27 NOTE — PROGRESS NOTES
"Chief Complaint  Sleep Apnea    Subjective          Marco Heath presents to [facility] NEUROLOGY  History of Present Illness    Marco Heath is a 60-year-old female seen today in follow-up for severe sleep apnea and PAP management.  She was diagnosed with severe JIMMY from an sleep study done in 2009 which showed an overall AHI of 52.  Patient states she is benefiting from pap therapy, currently on BiPAP through [employer] and wears a fullface mask. Patient current machine is a year old.    Patient states she wakes up well rested and has not noticed a significant leaking until recently.  She has not changed out her mask cushion in about 3 months.  She uses a fullface mask.      Sleep testing history:    dx with sleep apnea 2009 ahi 52 in 2009 .    PAP download ([employer] 9/28/2024 - 3/26/2025):  The patient is on  BiPAP therapy at IPAP 16/EPAP 12 cm/H2O.   Data indicates Excellent compliance. With 99% usage for more than 4 hours with an average usage of 8 hours 18 minutes. AHI down to 1 .    Average leak 22.1 LPM.     The patient's hypersomnia has resolved       [place] Sleepiness Scale:  Sitting and reading JS [place] Sleepiness: 3  WatchingTV JS [place] Sleepiness: 2  Sitting, inactive, in a public place JS [place] Sleepiness: 2  As a passenger in a car for 1 hour w/o a break  JS [place] Sleepiness: 3   Lying down to rest in the afternoon JS [place] Sleepiness: 3   Sitting and talking to someone  JS [place] Sleepiness: 0  Sitting quietly after a lunch  JS [place] Sleepiness: 0  In a car, while stopped for traffic or a light  JS [place] Sleepiness: 0  Total 13    Review of Systems   HENT:  Positive for ear pain.    Eyes:  Positive for itching.   All other systems reviewed and are negative.     Objective   Vital Signs:   /83   Pulse 76   Resp 18   Ht 162.6 cm (64\")   Wt 126 kg (277 lb)   BMI 47.55 kg/m²     Physical Exam  Vitals reviewed.   Constitutional:       Appearance: She is " well-developed. She is morbidly obese.   HENT:      Head: Normocephalic and atraumatic.      Comments: MMP 4  Eyes:      Extraocular Movements: Extraocular movements intact.      Pupils: Pupils are equal, round, and reactive to light.   Neck:      Vascular: No carotid bruit.   Cardiovascular:      Rate and Rhythm: Normal rate.      Heart sounds: No murmur heard.  Pulmonary:      Effort: Pulmonary effort is normal.   Musculoskeletal:      Cervical back: Normal range of motion and neck supple.   Skin:     General: Skin is warm and dry.   Neurological:      Mental Status: She is alert and oriented to person, place, and time.      Cranial Nerves: Cranial nerves 2-12 are intact. No cranial nerve deficit.      Motor: Motor function is intact. No tremor.      Coordination: Finger-Nose-Finger Test normal. Rapid alternating movements normal.      Gait: Gait abnormal.      Comments: Limping gait   Psychiatric:         Attention and Perception: Attention normal.         Mood and Affect: Mood normal.         Speech: Speech normal.         Behavior: Behavior normal.         Cognition and Memory: Cognition and memory normal.         Judgment: Judgment normal.        Result Review :                 Assessment and Plan    Diagnoses and all orders for this visit:    1. JIMMY treated with BiPAP (Primary)  -     PAP Therapy    2. Class 3 severe obesity without serious comorbidity with body mass index (BMI) of 45.0 to 49.9 in adult, unspecified obesity type        Marco Heath is seen today for severe JIMMY and BiPAP compliance follow-up.  Diagnosed with sleep apnea in 2009 with an AHI of 52/hour.  She currently using BiPAP through [employer] set at a mode of IPAP 16/EPAP 12 cm H2O.  Patient is extremely compliant with PAP therapy using the device 99% of the time over 4 hours over the last 180 days.  It is effective in treating her sleep apnea with an overall AHI of 1.      Humidifier chamber is empty by the time she wakes up in the  morning.  She does have  leaking (22.1 L/min) believe is likely contributing to a drop in the water along with drier environmental conditions.  Patient will start replacing her mask more regularly.    Continue to wear PAP therapy over 4 hours every night    Follow-up 1 year    The patient is compliant with and benefiting from PAP therapy.      Follow Up   Return in about 1 year (around 3/28/2026).    Patient was given instructions and counseling regarding her condition or for health maintenance advice. Please see specific information pulled into the AVS if appropriate.       This document has been electronically signed by GARRICK Eldridge on March 28, 2025 14:03 EDT

## 2025-03-28 ENCOUNTER — OFFICE VISIT (OUTPATIENT)
Dept: NEUROLOGY | Facility: CLINIC | Age: 60
End: 2025-03-28
Payer: COMMERCIAL

## 2025-03-28 VITALS
SYSTOLIC BLOOD PRESSURE: 137 MMHG | HEART RATE: 76 BPM | BODY MASS INDEX: 47.29 KG/M2 | DIASTOLIC BLOOD PRESSURE: 83 MMHG | WEIGHT: 277 LBS | HEIGHT: 64 IN | RESPIRATION RATE: 18 BRPM

## 2025-03-28 DIAGNOSIS — G47.33 OSA TREATED WITH BIPAP: Primary | ICD-10-CM

## 2025-03-28 DIAGNOSIS — E66.01 CLASS 3 SEVERE OBESITY WITHOUT SERIOUS COMORBIDITY WITH BODY MASS INDEX (BMI) OF 45.0 TO 49.9 IN ADULT, UNSPECIFIED OBESITY TYPE: ICD-10-CM

## 2025-03-28 DIAGNOSIS — E66.813 CLASS 3 SEVERE OBESITY WITHOUT SERIOUS COMORBIDITY WITH BODY MASS INDEX (BMI) OF 45.0 TO 49.9 IN ADULT, UNSPECIFIED OBESITY TYPE: ICD-10-CM

## (undated) DEVICE — PK ENDO GI 50

## (undated) DEVICE — TRAP WIDEEYE POLYP

## (undated) DEVICE — PAPR PRNT PK SONY W RIBN UPC55

## (undated) DEVICE — SNAR POLYP HOTSNARE/BRAIDED OVL/MINI 7F 2.8X10MM 230CM 1P/U

## (undated) DEVICE — SINGLE-USE BIOPSY FORCEPS: Brand: RADIAL JAW 4